# Patient Record
Sex: MALE | Race: WHITE | NOT HISPANIC OR LATINO | Employment: FULL TIME | URBAN - METROPOLITAN AREA
[De-identification: names, ages, dates, MRNs, and addresses within clinical notes are randomized per-mention and may not be internally consistent; named-entity substitution may affect disease eponyms.]

---

## 2019-10-13 ENCOUNTER — HOSPITAL ENCOUNTER (EMERGENCY)
Facility: HOSPITAL | Age: 39
Discharge: HOME | End: 2019-10-13
Admitting: EMERGENCY MEDICINE

## 2019-10-13 VITALS
WEIGHT: 141.54 LBS | DIASTOLIC BLOOD PRESSURE: 79 MMHG | SYSTOLIC BLOOD PRESSURE: 114 MMHG | TEMPERATURE: 97.8 F | OXYGEN SATURATION: 98 % | HEIGHT: 68 IN | BODY MASS INDEX: 21.45 KG/M2 | RESPIRATION RATE: 16 BRPM | HEART RATE: 74 BPM

## 2019-10-13 DIAGNOSIS — J06.9 URTI (ACUTE UPPER RESPIRATORY INFECTION): ICD-10-CM

## 2019-10-13 DIAGNOSIS — R63.0 DECREASED APPETITE: ICD-10-CM

## 2019-10-13 DIAGNOSIS — R68.89 FLU-LIKE SYMPTOMS: Primary | ICD-10-CM

## 2019-10-13 LAB
ANION GAP SERPL CALCULATED.3IONS-SCNC: 12.8 MMOL/L (ref 5–15)
BUN BLD-MCNC: 10 MG/DL (ref 8–20)
BUN/CREAT SERPL: 12.5 (ref 6.2–20.3)
CALCIUM SPEC-SCNC: 9.2 MG/DL (ref 8.9–10.3)
CHLORIDE SERPL-SCNC: 101 MMOL/L (ref 101–111)
CO2 SERPL-SCNC: 25 MMOL/L (ref 22–32)
CREAT BLD-MCNC: 0.8 MG/DL (ref 0.7–1.2)
DEPRECATED RDW RBC AUTO: 40.7 FL (ref 37–54)
EOSINOPHIL # BLD MANUAL: 0.06 10*3/MM3 (ref 0–0.4)
EOSINOPHIL NFR BLD MANUAL: 1 % (ref 0.3–6.2)
ERYTHROCYTE [DISTWIDTH] IN BLOOD BY AUTOMATED COUNT: 13.8 % (ref 12.3–15.4)
FLUAV AG NPH QL: NEGATIVE
FLUBV AG NPH QL IA: NEGATIVE
GFR SERPL CREATININE-BSD FRML MDRD: 108 ML/MIN/1.73
GLUCOSE BLD-MCNC: 114 MG/DL (ref 65–99)
HCT VFR BLD AUTO: 41.1 % (ref 37.5–51)
HGB BLD-MCNC: 14.5 G/DL (ref 13–17.7)
LYMPHOCYTES # BLD MANUAL: 1.23 10*3/MM3 (ref 0.7–3.1)
LYMPHOCYTES NFR BLD MANUAL: 22 % (ref 19.6–45.3)
LYMPHOCYTES NFR BLD MANUAL: 7 % (ref 5–12)
MCH RBC QN AUTO: 29.4 PG (ref 26.6–33)
MCHC RBC AUTO-ENTMCNC: 35.3 G/DL (ref 31.5–35.7)
MCV RBC AUTO: 83.1 FL (ref 79–97)
MONOCYTES # BLD AUTO: 0.39 10*3/MM3 (ref 0.1–0.9)
NEUTROPHILS # BLD AUTO: 3.86 10*3/MM3 (ref 1.7–7)
NEUTROPHILS NFR BLD MANUAL: 61 % (ref 42.7–76)
NEUTS BAND NFR BLD MANUAL: 8 % (ref 0–5)
PLAT MORPH BLD: NORMAL
PLATELET # BLD AUTO: 213 10*3/MM3 (ref 140–450)
PMV BLD AUTO: 7.4 FL (ref 6–12)
POTASSIUM BLD-SCNC: 3.8 MMOL/L (ref 3.6–5.1)
RBC # BLD AUTO: 4.95 10*6/MM3 (ref 4.14–5.8)
RBC MORPH BLD: NORMAL
SCAN SLIDE: NORMAL
SODIUM BLD-SCNC: 135 MMOL/L (ref 136–144)
VARIANT LYMPHS NFR BLD MANUAL: 1 % (ref 0–5)
WBC MORPH BLD: NORMAL
WBC NRBC COR # BLD: 5.6 10*3/MM3 (ref 3.4–10.8)

## 2019-10-13 PROCEDURE — 85025 COMPLETE CBC W/AUTO DIFF WBC: CPT | Performed by: PHYSICIAN ASSISTANT

## 2019-10-13 PROCEDURE — 99283 EMERGENCY DEPT VISIT LOW MDM: CPT

## 2019-10-13 PROCEDURE — 80048 BASIC METABOLIC PNL TOTAL CA: CPT | Performed by: PHYSICIAN ASSISTANT

## 2019-10-13 PROCEDURE — 87804 INFLUENZA ASSAY W/OPTIC: CPT | Performed by: PHYSICIAN ASSISTANT

## 2019-10-13 PROCEDURE — 85007 BL SMEAR W/DIFF WBC COUNT: CPT | Performed by: PHYSICIAN ASSISTANT

## 2019-10-13 RX ORDER — ONDANSETRON 4 MG/1
4 TABLET, ORALLY DISINTEGRATING ORAL EVERY 8 HOURS PRN
Qty: 20 TABLET | Refills: 0 | Status: SHIPPED | OUTPATIENT
Start: 2019-10-13 | End: 2021-01-08

## 2019-10-13 RX ORDER — SODIUM CHLORIDE 0.9 % (FLUSH) 0.9 %
10 SYRINGE (ML) INJECTION AS NEEDED
Status: DISCONTINUED | OUTPATIENT
Start: 2019-10-13 | End: 2019-10-13 | Stop reason: HOSPADM

## 2019-10-13 RX ADMIN — SODIUM CHLORIDE 1000 ML: 900 INJECTION, SOLUTION INTRAVENOUS at 12:43

## 2019-10-13 NOTE — DISCHARGE INSTRUCTIONS
Return to the ER for any worsening symptoms.  Drink plenty of fluids including Pedialyte.  Follow-up with the [facility] in 1 to 3 days if no better.

## 2019-10-13 NOTE — ED PROVIDER NOTES
Subjective   History:  She is a 39-year-old male presents to the ER with 2 to 3-day history of generalized malaise headache feeling weak.  He is concerned that he has a fluid he did not receive a flu vaccine this year.  Denies any significant cough.  Reports is constant.  His wife reports he has not been drinking as much at home.  He reports he had one episode of emesis this morning when he tried to drink some Gatorade.    Onset: 2-3 days  Location: generalized  Duration: constant  Character: fatigue, body aches, HA  Aggravating/Alleviating factors: None  Radiation None  Severity: moderate              Review of Systems   Constitutional: Positive for activity change, appetite change and fatigue.   Respiratory: Negative.    Cardiovascular: Negative.    Gastrointestinal: Negative.    Genitourinary: Negative.    Musculoskeletal: Negative.    Skin: Negative.    Neurological: Positive for headaches.   Psychiatric/Behavioral: Negative.        No past medical history on file.    No Known Allergies    No past surgical history on file.    No family history on file.    Social History     Socioeconomic History   • Marital status: Single     Spouse name: Not on file   • Number of children: Not on file   • Years of education: Not on file   • Highest education level: Not on file           Objective   Physical Exam   Constitutional: He is oriented to person, place, and time. He appears well-developed and well-nourished.   HENT:   Head: Normocephalic and atraumatic.   Eyes: Pupils are equal, round, and reactive to light.   Neck: Normal range of motion.   Cardiovascular: Normal rate and regular rhythm.   Pulmonary/Chest: Effort normal and breath sounds normal.   Musculoskeletal: Normal range of motion.   Neurological: He is alert and oriented to person, place, and time.   Skin: Skin is warm and dry.   Psychiatric: He has a normal mood and affect. His behavior is normal.       Procedures           ED Course        No radiology  results for the last day  Labs Reviewed   BASIC METABOLIC PANEL - Abnormal; Notable for the following components:       Result Value    Glucose 114 (*)     Sodium 135 (*)     All other components within normal limits   MANUAL DIFFERENTIAL - Abnormal; Notable for the following components:    Bands %  8.0 (*)     All other components within normal limits   INFLUENZA ANTIGEN, RAPID - Normal   CBC WITH AUTO DIFFERENTIAL - Normal   SCAN SLIDE   CBC AND DIFFERENTIAL    Narrative:     The following orders were created for panel order CBC & Differential.  Procedure                               Abnormality         Status                     ---------                               -----------         ------                     CBC Auto Differential[[id number]]        Normal              Final result                 Please view results for these tests on the individual orders.     Medications   sodium chloride 0.9 % flush 10 mL (not administered)   sodium chloride 0.9 % bolus 1,000 mL (1,000 mL Intravenous New Bag 10/13/19 1243)               MDM  Number of Diagnoses or Management Options  Decreased appetite:   Flu-like symptoms:   URTI (acute upper respiratory infection):   Diagnosis management comments: DISPOSITION:   Chart Review:  Comorbidity:  has no past medical history on file.  Differentials:this list is not all inclusive and does not constitute the entirety of considered causes --> Influenza, viral infection  Labs: CBC and BMP unremarkable     Imaging: Was interpreted by physician and reviewed by myself:  No radiology results for the last day    Disposition/Treatment:  Patient is a 39-year-old male presents to the ER with generalized fatigue headache malaise.  He was negative for flu his blood work is unremarkable he was rehydrated with a liter fluid he was discharged home with Zofran.  Return precautions all concerns were provided he was stable at time of discharge and family was in agreement with plan       Amount  and/or Complexity of Data Reviewed  Clinical lab tests: reviewed    Patient Progress  Patient progress: stable      Final diagnoses:   Flu-like symptoms   Decreased appetite   URTI (acute upper respiratory infection)              Frances Callaway PA-C  10/13/19 6137

## 2019-10-15 ENCOUNTER — HOSPITAL ENCOUNTER (EMERGENCY)
Facility: HOSPITAL | Age: 39
Discharge: HOME | End: 2019-10-15
Admitting: EMERGENCY MEDICINE

## 2019-10-15 ENCOUNTER — APPOINTMENT (OUTPATIENT)
Dept: CT IMAGING | Facility: HOSPITAL | Age: 39
End: 2019-10-15

## 2019-10-15 VITALS
HEART RATE: 68 BPM | HEIGHT: 68 IN | BODY MASS INDEX: 21.75 KG/M2 | SYSTOLIC BLOOD PRESSURE: 110 MMHG | DIASTOLIC BLOOD PRESSURE: 84 MMHG | TEMPERATURE: 98.1 F | WEIGHT: 143.52 LBS | OXYGEN SATURATION: 100 % | RESPIRATION RATE: 16 BRPM

## 2019-10-15 DIAGNOSIS — M79.10 MYALGIA: ICD-10-CM

## 2019-10-15 DIAGNOSIS — R74.8 ELEVATED LIVER ENZYMES: ICD-10-CM

## 2019-10-15 DIAGNOSIS — R50.9 FEVER IN ADULT: Primary | ICD-10-CM

## 2019-10-15 LAB
ALBUMIN SERPL-MCNC: 4.3 G/DL (ref 3.5–5.2)
ALBUMIN/GLOB SERPL: 1.7 G/DL
ALP SERPL-CCNC: 131 U/L (ref 39–117)
ALT SERPL W P-5'-P-CCNC: 203 U/L (ref 1–41)
AMPHET+METHAMPHET UR QL: NEGATIVE
ANION GAP SERPL CALCULATED.3IONS-SCNC: 14.7 MMOL/L (ref 5–15)
AST SERPL-CCNC: 145 U/L (ref 1–40)
B PERT DNA SPEC QL NAA+PROBE: NOT DETECTED
BARBITURATES UR QL SCN: NEGATIVE
BASOPHILS # BLD AUTO: 0.1 10*3/MM3 (ref 0–0.2)
BASOPHILS NFR BLD AUTO: 1 % (ref 0–1.5)
BENZODIAZ UR QL SCN: NEGATIVE
BILIRUB SERPL-MCNC: 0.8 MG/DL (ref 0.2–1.2)
BILIRUB UR QL STRIP: NEGATIVE
BUN BLD-MCNC: 8 MG/DL (ref 6–20)
BUN/CREAT SERPL: 10.4 (ref 7–25)
C PNEUM DNA NPH QL NAA+NON-PROBE: NOT DETECTED
CALCIUM SPEC-SCNC: 9.1 MG/DL (ref 8.6–10.5)
CANNABINOIDS SERPL QL: NEGATIVE
CHLORIDE SERPL-SCNC: 99 MMOL/L (ref 98–107)
CK SERPL-CCNC: 47 U/L (ref 20–200)
CLARITY UR: CLEAR
CO2 SERPL-SCNC: 25 MMOL/L (ref 22–29)
COCAINE UR QL: NEGATIVE
COLOR UR: ABNORMAL
CREAT BLD-MCNC: 0.77 MG/DL (ref 0.76–1.27)
DEPRECATED RDW RBC AUTO: 41.1 FL (ref 37–54)
EOSINOPHIL # BLD AUTO: 0.3 10*3/MM3 (ref 0–0.4)
EOSINOPHIL NFR BLD AUTO: 4.5 % (ref 0.3–6.2)
ERYTHROCYTE [DISTWIDTH] IN BLOOD BY AUTOMATED COUNT: 13.9 % (ref 12.3–15.4)
FLUAV H1 2009 PAND RNA NPH QL NAA+PROBE: NOT DETECTED
FLUAV H1 HA GENE NPH QL NAA+PROBE: NOT DETECTED
FLUAV H3 RNA NPH QL NAA+PROBE: NOT DETECTED
FLUAV SUBTYP SPEC NAA+PROBE: NOT DETECTED
FLUBV RNA ISLT QL NAA+PROBE: NOT DETECTED
GFR SERPL CREATININE-BSD FRML MDRD: 113 ML/MIN/1.73
GLOBULIN UR ELPH-MCNC: 2.5 GM/DL
GLUCOSE BLD-MCNC: 99 MG/DL (ref 65–99)
GLUCOSE UR STRIP-MCNC: NEGATIVE MG/DL
HADV DNA SPEC NAA+PROBE: NOT DETECTED
HAV IGM SERPL QL IA: NORMAL
HBV CORE IGM SERPL QL IA: NORMAL
HBV SURFACE AG SERPL QL IA: NORMAL
HCOV 229E RNA SPEC QL NAA+PROBE: NOT DETECTED
HCOV HKU1 RNA SPEC QL NAA+PROBE: NOT DETECTED
HCOV NL63 RNA SPEC QL NAA+PROBE: NOT DETECTED
HCOV OC43 RNA SPEC QL NAA+PROBE: NOT DETECTED
HCT VFR BLD AUTO: 39.5 % (ref 37.5–51)
HCV AB SER DONR QL: NORMAL
HGB BLD-MCNC: 13.8 G/DL (ref 13–17.7)
HGB UR QL STRIP.AUTO: NEGATIVE
HMPV RNA NPH QL NAA+NON-PROBE: NOT DETECTED
HOLD SPECIMEN: NORMAL
HPIV1 RNA SPEC QL NAA+PROBE: NOT DETECTED
HPIV2 RNA SPEC QL NAA+PROBE: NOT DETECTED
HPIV3 RNA NPH QL NAA+PROBE: NOT DETECTED
HPIV4 P GENE NPH QL NAA+PROBE: NOT DETECTED
KETONES UR QL STRIP: NEGATIVE
LEUKOCYTE ESTERASE UR QL STRIP.AUTO: NEGATIVE
LYMPHOCYTES # BLD AUTO: 1.7 10*3/MM3 (ref 0.7–3.1)
LYMPHOCYTES NFR BLD AUTO: 24.5 % (ref 19.6–45.3)
M PNEUMO IGG SER IA-ACNC: NOT DETECTED
MCH RBC QN AUTO: 29.3 PG (ref 26.6–33)
MCHC RBC AUTO-ENTMCNC: 34.9 G/DL (ref 31.5–35.7)
MCV RBC AUTO: 83.8 FL (ref 79–97)
METHADONE UR QL SCN: NEGATIVE
MONOCYTES # BLD AUTO: 0.6 10*3/MM3 (ref 0.1–0.9)
MONOCYTES NFR BLD AUTO: 8.8 % (ref 5–12)
NEUTROPHILS # BLD AUTO: 4.2 10*3/MM3 (ref 1.7–7)
NEUTROPHILS NFR BLD AUTO: 61.2 % (ref 42.7–76)
NITRITE UR QL STRIP: NEGATIVE
NRBC BLD AUTO-RTO: 0.1 /100 WBC (ref 0–0.2)
OPIATES UR QL: NEGATIVE
OXYCODONE UR QL SCN: NEGATIVE
PH UR STRIP.AUTO: 6 [PH] (ref 5–8)
PLATELET # BLD AUTO: 261 10*3/MM3 (ref 140–450)
PMV BLD AUTO: 7.6 FL (ref 6–12)
POTASSIUM BLD-SCNC: 3.7 MMOL/L (ref 3.5–5.2)
PROT SERPL-MCNC: 6.8 G/DL (ref 6–8.5)
PROT UR QL STRIP: NEGATIVE
RBC # BLD AUTO: 4.71 10*6/MM3 (ref 4.14–5.8)
RHINOVIRUS RNA SPEC NAA+PROBE: NOT DETECTED
RSV RNA NPH QL NAA+NON-PROBE: NOT DETECTED
S PYO AG THROAT QL: NEGATIVE
SODIUM BLD-SCNC: 135 MMOL/L (ref 136–145)
SP GR UR STRIP: 1.02 (ref 1–1.03)
UROBILINOGEN UR QL STRIP: ABNORMAL
WBC NRBC COR # BLD: 6.9 10*3/MM3 (ref 3.4–10.8)
WHOLE BLOOD HOLD SPECIMEN: NORMAL

## 2019-10-15 PROCEDURE — 36415 COLL VENOUS BLD VENIPUNCTURE: CPT

## 2019-10-15 PROCEDURE — 82550 ASSAY OF CK (CPK): CPT | Performed by: NURSE PRACTITIONER

## 2019-10-15 PROCEDURE — 86757 RICKETTSIA ANTIBODY: CPT | Performed by: NURSE PRACTITIONER

## 2019-10-15 PROCEDURE — 80307 DRUG TEST PRSMV CHEM ANLYZR: CPT | Performed by: NURSE PRACTITIONER

## 2019-10-15 PROCEDURE — 0 IOPAMIDOL PER 1 ML: Performed by: NURSE PRACTITIONER

## 2019-10-15 PROCEDURE — 86666 EHRLICHIA ANTIBODY: CPT | Performed by: NURSE PRACTITIONER

## 2019-10-15 PROCEDURE — 81003 URINALYSIS AUTO W/O SCOPE: CPT | Performed by: NURSE PRACTITIONER

## 2019-10-15 PROCEDURE — 99284 EMERGENCY DEPT VISIT MOD MDM: CPT

## 2019-10-15 PROCEDURE — 87040 BLOOD CULTURE FOR BACTERIA: CPT | Performed by: NURSE PRACTITIONER

## 2019-10-15 PROCEDURE — 86622 BRUCELLA ANTIBODY: CPT | Performed by: NURSE PRACTITIONER

## 2019-10-15 PROCEDURE — 86618 LYME DISEASE ANTIBODY: CPT | Performed by: NURSE PRACTITIONER

## 2019-10-15 PROCEDURE — 0099U HC BIOFIRE FILMARRAY RESP PANEL 1: CPT | Performed by: NURSE PRACTITIONER

## 2019-10-15 PROCEDURE — 80074 ACUTE HEPATITIS PANEL: CPT | Performed by: NURSE PRACTITIONER

## 2019-10-15 PROCEDURE — 80053 COMPREHEN METABOLIC PANEL: CPT | Performed by: NURSE PRACTITIONER

## 2019-10-15 PROCEDURE — 85025 COMPLETE CBC W/AUTO DIFF WBC: CPT | Performed by: NURSE PRACTITIONER

## 2019-10-15 PROCEDURE — 74177 CT ABD & PELVIS W/CONTRAST: CPT

## 2019-10-15 PROCEDURE — 87651 STREP A DNA AMP PROBE: CPT | Performed by: NURSE PRACTITIONER

## 2019-10-15 RX ORDER — SODIUM CHLORIDE 0.9 % (FLUSH) 0.9 %
10 SYRINGE (ML) INJECTION AS NEEDED
Status: DISCONTINUED | OUTPATIENT
Start: 2019-10-15 | End: 2019-10-15 | Stop reason: HOSPADM

## 2019-10-15 RX ORDER — OMEPRAZOLE 20 MG/1
20 CAPSULE, DELAYED RELEASE ORAL DAILY PRN
COMMUNITY

## 2019-10-15 RX ORDER — IBUPROFEN 200 MG
TABLET ORAL
Status: COMPLETED
Start: 2019-10-15 | End: 2019-10-15

## 2019-10-15 RX ORDER — DOXYCYCLINE HYCLATE 100 MG
100 TABLET ORAL 2 TIMES DAILY
Qty: 20 TABLET | Refills: 0 | Status: SHIPPED | OUTPATIENT
Start: 2019-10-15 | End: 2019-10-25

## 2019-10-15 RX ORDER — IBUPROFEN 400 MG/1
800 TABLET ORAL ONCE
Status: COMPLETED | OUTPATIENT
Start: 2019-10-15 | End: 2019-10-15

## 2019-10-15 RX ADMIN — IBUPROFEN 800 MG: 400 TABLET ORAL at 12:16

## 2019-10-15 RX ADMIN — IOPAMIDOL 100 ML: 755 INJECTION, SOLUTION INTRAVENOUS at 11:15

## 2019-10-15 RX ADMIN — SODIUM CHLORIDE 1000 ML: 900 INJECTION, SOLUTION INTRAVENOUS at 08:54

## 2019-10-15 RX ADMIN — IBUPROFEN 800 MG: 200 TABLET, FILM COATED ORAL at 12:16

## 2019-10-15 NOTE — ED NOTES
Awaiting plan of care, family at bedside denies the need for anything at this time     Kalyn Gimenez RN  10/15/19 3154

## 2019-10-15 NOTE — ED NOTES
Pt is awaiting CT results at this time  Denies the need for anything     Kalyn Gimenez RN  10/15/19 1889

## 2019-10-15 NOTE — ED NOTES
Pt states that symptoms started thurs, that he came to the ER Sunday and still has cont to feel worse. Pt c/o cold/ hot flashes with some nausea and being achy. Pt also states his nose has been bleeding.      Lokesh Eckert, LPN  10/15/19 0846

## 2019-10-15 NOTE — ED NOTES
Pt is resting in bed. SO at bedside awaiting results. Denies the need for anything at this time     Kalyn Gimenez RN  10/15/19 0979

## 2019-10-15 NOTE — ED PROVIDER NOTES
Subjective   Patient is a 39-year-old white male with no significant medical history who presents today with complaints of generalized body aches, chills, and just feeling bad overall.  States his symptoms started approximately 5 days ago.  States he was seen here in the ED 2 days ago for the same symptoms had benign work-up was told that he probably had a viral illness.  Reports his symptoms are no better.  He denies any cough or shortness of breath.  Does report some mild nasal congestion.  Denies sore throat.  Denies any nausea vomiting or diarrhea.  Reports no bowel movement x1 week but states he has not had much oral intake due to just not feeling well.  Reports decrease in urine output with some dark-colored urine.  He denies any dysuria frequency urgency.  Denies any chest pain or abdominal pain.  Denies any rash.  Denies any known tick or insect bites.  Denies any ill contacts or recent travel.            Review of Systems   Constitutional: Positive for chills and fatigue. Negative for fever.   HENT: Positive for congestion. Negative for sore throat.    Respiratory: Negative for cough and shortness of breath.    Cardiovascular: Negative for chest pain.   Gastrointestinal: Negative for abdominal pain, diarrhea, nausea and vomiting.   Genitourinary: Positive for decreased urine volume. Negative for difficulty urinating, dysuria, frequency and urgency.   Musculoskeletal: Positive for myalgias.   Skin: Negative for rash.   Neurological: Positive for weakness. Negative for headaches.       Past Medical History:   Diagnosis Date   • GERD (gastroesophageal reflux disease)        No Known Allergies    Past Surgical History:   Procedure Laterality Date   • ABDOMINAL SURGERY         History reviewed. No pertinent family history.    Social History     Socioeconomic History   • Marital status: Single     Spouse name: Not on file   • Number of children: Not on file   • Years of education: Not on file   • Highest  education level: Not on file   Tobacco Use   • Smoking status: Former Smoker   • Smokeless tobacco: Never Used   • Tobacco comment: 7 months ago   Substance and Sexual Activity   • Alcohol use: No     Frequency: Never   • Drug use: No   • Sexual activity: Defer           Objective   Physical Exam   Constitutional: He appears well-developed.   Vital signs and triage nurse note reviewed.  Constitutional: Awake, alert; well-developed and well-nourished. No acute distress is noted.  HEENT: Normocephalic, atraumatic; pupils are PERRL with intact EOM; oropharynx is pink and moist without exudate or erythema.  No drooling or pooling of oral secretions.  Neck: Supple, full range of motion without pain; no cervical lymphadenopathy. Normal phonation.  Cardiovascular: Regular rate and rhythm, normal S1-S2.  No murmur noted.  Pulmonary: Respiratory effort regular nonlabored, breath sounds clear to auscultation all fields.  Abdomen: Soft, nontender, nondistended with normoactive bowel sounds; no rebound or guarding.  Musculoskeletal: Independent range of motion of all extremities with no palpable tenderness or edema.  Neuro: Alert oriented x3, speech is clear and appropriate, GCS 15.    Skin: Flesh tone, warm, dry, intact; no erythematous or petechial rash or lesion.        Procedures           ED Course      Labs Reviewed   COMPREHENSIVE METABOLIC PANEL - Abnormal; Notable for the following components:       Result Value    Sodium 135 (*)     ALT (SGPT) 203 (*)     AST (SGOT) 145 (*)     Alkaline Phosphatase 131 (*)     All other components within normal limits    Narrative:     GFR Normal >60  Chronic Kidney Disease <60  Kidney Failure <15   URINALYSIS W/ CULTURE IF INDICATED - Abnormal; Notable for the following components:    Color, UA Dark Yellow (*)     Urobilinogen, UA 2.0 E.U./dL (*)     All other components within normal limits    Narrative:     Urine microscopic not indicated.   RAPID STREP A SCREEN - Normal    RESPIRATORY PANEL, PCR - Normal   URINE DRUG SCREEN - Normal    Narrative:     Negative Thresholds For Drugs Screened:     Amphetamines               500 ng/ml   Barbiturates               200 ng/ml   Benzodiazepines            100 ng/ml   Cocaine                    300 ng/ml   Methadone                  300 ng/ml   Opiates                    300 ng/ml   Oxycodone                  100 ng/ml   THC                        50 ng/ml    The Normal Value for all drugs tested is negative. This report includes final unconfirmed screening results to be used for medical treatment purposes only. Unconfirmed results must not be used for non-medical purposes such as employment or legal testing. Clinical consideration should be applied to any drug of abuse test, particulary when unconfirmed results are used.  All urine drugs of abuse requests without chain of custody are for medical screening purposes only.  False positives are possible.     CK - Normal   CBC WITH AUTO DIFFERENTIAL - Normal   HEPATITIS PANEL, ACUTE - Normal   BLOOD CULTURE   BLOOD CULTURE   TICK PANEL    Narrative:     The following orders were created for panel order Tick Panel - Blood, Arm, Right.  Procedure                               Abnormality         Status                     ---------                               -----------         ------                     Febrile Antibody Profile[[id number]]                                                    E. Chaffeenis-HME (Monoc...[[id number]]                                                 Human Granulocytic Ehrli...[[id number]]                                                 Lyme Antibody IgG[[id number]]                                                             Please view results for these tests on the individual orders.   FEBRILE ANTIBODY PROFILE   E. CHAFFEENIS-HME (MONOCYTIC)   HUMAN GRANULOCYTIC EVAN-HGE   LYME ANTIBODY IGG   CBC AND DIFFERENTIAL    Narrative:     The following orders were created for  panel order CBC & Differential.  Procedure                               Abnormality         Status                     ---------                               -----------         ------                     CBC Auto Differential[[id number]]        Normal              Final result                 Please view results for these tests on the individual orders.   EXTRA TUBES    Narrative:     The following orders were created for panel order Extra Tubes.  Procedure                               Abnormality         Status                     ---------                               -----------         ------                     Light Blue Top[[id number]]                                   Final result               Gold Top - SST[[id number]]                                   Final result                 Please view results for these tests on the individual orders.   LIGHT BLUE TOP   GOLD TOP - SST     Ct Abdomen Pelvis With Contrast    Result Date: 10/15/2019  No acute process identified within abdomen/pelvis.  Electronically Signed By-DR. Denzel Her MD On:10/15/2019 11:42 AM This report was finalized on [id number] by DR. Denzel Her MD.    Medications   sodium chloride 0.9 % flush 10 mL (10 mL Intravenous Not Given 10/15/19 1241)   sodium chloride 0.9 % bolus 1,000 mL (0 mL Intravenous Stopped 10/15/19 0924)   iopamidol (ISOVUE-370) 76 % injection 100 mL (100 mL Intravenous Given 10/15/19 1115)   ibuprofen (ADVIL,MOTRIN) tablet 800 mg (800 mg Oral Given 10/15/19 1216)                 MDM  Number of Diagnoses or Management Options  Elevated liver enzymes:   Fever in adult:   Myalgia:      Amount and/or Complexity of Data Reviewed  Clinical lab tests: ordered and reviewed  Tests in the radiology section of CPT®: reviewed and ordered    Risk of Complications, Morbidity, and/or Mortality  General comments: Comorbidities: None  Differentials: Viral illness, dehydration, elect light abnormality,;this list is not all  inclusive and does not constitute the entirety of considered causes    Patient had IV established.  He had labs obtained.  He was given a fluid bolus.  Patient is given ibuprofen for his fever.  Upon review of labs, patient had CT of the abdomen pelvis obtained.    On reexamination, patient is resting comfortably in no acute distress.  He denies any new complaints at this time.  His vital signs are stable.  He remains well-appearing and nontoxic in appearance.  Tick panel is pending on discharge.    Diagnosis and treatment plan discussed with patient.  Patient agreeable to plan.   I discussed findings with patient who voices understanding of discharge instructions, signs and symptoms requiring return to ED; discharged improved and in stable condition with follow up for re-evaluation.  Prescription for doxycycline.      Patient Progress  Patient progress: stable      Final diagnoses:   Fever in adult   Myalgia   Elevated liver enzymes              Melany Black, APRN  10/15/19 1243

## 2019-10-15 NOTE — ED NOTES
Pt reports that he has a headache and is starting to have chills again, temp was taken 100.4 rectally. Motrin was given. Vitals are stable     Kalyn Gimenez RN  10/15/19 9108

## 2019-10-15 NOTE — DISCHARGE INSTRUCTIONS
Take antibiotics as prescribed.  Drink plenty fluids.  May continue ibuprofen as needed every 8 hours for your fever.  Avoid Tylenol or acetaminophen.  Follow-up with primary care physician or [facility].  Return for new or worsening symptoms.

## 2019-10-16 LAB — B BURGDOR IGG SER QL: NEGATIVE

## 2019-10-17 LAB
A PHAGOCYTOPH IGM TITR SER IF: NEGATIVE {TITER}
CONV HGE IGG TITER: NEGATIVE
E CHAFFEENSIS IGG TITR SER IF: NEGATIVE {TITER}
E. CHAFFEENSIS (HME) IGM TITER: NEGATIVE

## 2019-10-20 LAB — BACTERIA SPEC AEROBE CULT: NORMAL

## 2019-10-22 LAB
BRUCELLA IGG SER QL IA: NEGATIVE
BRUCELLA IGM SER QL IA: NEGATIVE
RICK SF IGG TITR SER IF: NORMAL {TITER}
RICK SF IGM TITR SER IF: NORMAL {TITER}
TYPHUS FEVER GROUP IGG: NORMAL
TYPHUS FEVER GROUP IGM: NORMAL

## 2020-04-10 ENCOUNTER — OFFICE (OUTPATIENT)
Dept: URBAN - METROPOLITAN AREA CLINIC 64 | Facility: CLINIC | Age: 40
End: 2020-04-10
Payer: COMMERCIAL

## 2020-04-10 VITALS — HEIGHT: 68 IN

## 2020-04-10 DIAGNOSIS — K62.5 HEMORRHAGE OF ANUS AND RECTUM: ICD-10-CM

## 2020-04-10 PROCEDURE — 99202 OFFICE O/P NEW SF 15 MIN: CPT | Mod: 95 | Performed by: INTERNAL MEDICINE

## 2020-04-10 RX ORDER — METRONIDAZOLE 500 MG/1
1000 TABLET, FILM COATED ORAL
Qty: 28 | Refills: 0 | Status: COMPLETED
Start: 2020-04-10 | End: 2020-07-29

## 2020-04-10 RX ORDER — HYDROCORTISONE 25 MG/G
OINTMENT TOPICAL
Qty: 30 | Refills: 3 | Status: ACTIVE
Start: 2020-04-10

## 2020-07-29 ENCOUNTER — OFFICE (OUTPATIENT)
Dept: URBAN - METROPOLITAN AREA CLINIC 64 | Facility: CLINIC | Age: 40
End: 2020-07-29

## 2020-07-29 VITALS
WEIGHT: 146 LBS | HEIGHT: 68 IN | HEART RATE: 75 BPM | DIASTOLIC BLOOD PRESSURE: 74 MMHG | SYSTOLIC BLOOD PRESSURE: 106 MMHG

## 2020-07-29 DIAGNOSIS — K62.5 HEMORRHAGE OF ANUS AND RECTUM: ICD-10-CM

## 2020-07-29 DIAGNOSIS — R14.3 FLATULENCE: ICD-10-CM

## 2020-07-29 DIAGNOSIS — R19.5 OTHER FECAL ABNORMALITIES: ICD-10-CM

## 2020-07-29 PROCEDURE — 99213 OFFICE O/P EST LOW 20 MIN: CPT | Performed by: INTERNAL MEDICINE

## 2020-07-29 RX ORDER — HYDROCORTISONE 25 MG/G
OINTMENT TOPICAL
Qty: 30 | Refills: 3 | Status: ACTIVE
Start: 2020-04-10

## 2020-10-05 ENCOUNTER — APPOINTMENT (OUTPATIENT)
Dept: GENERAL RADIOLOGY | Facility: HOSPITAL | Age: 40
End: 2020-10-05

## 2020-10-05 ENCOUNTER — HOSPITAL ENCOUNTER (EMERGENCY)
Facility: HOSPITAL | Age: 40
Discharge: HOME | End: 2020-10-05
Admitting: EMERGENCY MEDICINE

## 2020-10-05 VITALS
SYSTOLIC BLOOD PRESSURE: 113 MMHG | OXYGEN SATURATION: 98 % | HEART RATE: 87 BPM | DIASTOLIC BLOOD PRESSURE: 76 MMHG | RESPIRATION RATE: 18 BRPM | WEIGHT: 144.4 LBS | TEMPERATURE: 99 F | HEIGHT: 68 IN | BODY MASS INDEX: 21.89 KG/M2

## 2020-10-05 DIAGNOSIS — R05.9 COUGH: ICD-10-CM

## 2020-10-05 DIAGNOSIS — U07.1 CLINICAL DIAGNOSIS OF COVID-19: Primary | ICD-10-CM

## 2020-10-05 LAB — SARS-COV-2 RNA PNL SPEC NAA+PROBE: DETECTED

## 2020-10-05 PROCEDURE — 71045 X-RAY EXAM CHEST 1 VIEW: CPT

## 2020-10-05 PROCEDURE — 99283 EMERGENCY DEPT VISIT LOW MDM: CPT

## 2020-10-05 PROCEDURE — 87635 SARS-COV-2 COVID-19 AMP PRB: CPT | Performed by: NURSE PRACTITIONER

## 2020-10-05 RX ORDER — BROMPHENIRAMINE MALEATE, PSEUDOEPHEDRINE HYDROCHLORIDE, AND DEXTROMETHORPHAN HYDROBROMIDE 2; 30; 10 MG/5ML; MG/5ML; MG/5ML
5 SYRUP ORAL 4 TIMES DAILY PRN
Qty: 118 ML | Refills: 0 | Status: SHIPPED | OUTPATIENT
Start: 2020-10-05 | End: 2021-01-08

## 2020-10-05 NOTE — DISCHARGE INSTRUCTIONS
Quarantine for the next 11 days.  Follow-up with your family doctor or family [facility] for new or worsening symptoms, return for any new or worsening symptoms.  Medications as directed.  May take Tylenol.  Plenty fluids.

## 2020-10-05 NOTE — ED PROVIDER NOTES
"Subjective         Chief complaint: \"I want a covid test\"      Context: Patient is a 40-year-old male who comes in complaining of cough and generalized myalgias for the last 3 days.  He states his stepdaughter tested positive for COVID 3 days ago.  He states his temperature T-max has been 100.  He denies any loss of smell or taste.  He denies any abdominal pain nausea vomiting diarrhea chest pain unilateral focal deficits weakness confusion ataxia lethargy or swelling in his legs or feet.  Denies any hemoptysis, he states his cough is mostly dry.    Duration: 3 days    Timing: waxes and wanes    Severity: mild    Associated symptoms: has been taking tylenol with some relief          PCP:  None          Review of Systems   Constitutional: Positive for chills.   HENT: Negative.    Eyes: Negative for visual disturbance.   Respiratory: Positive for cough.    Cardiovascular: Negative.    Gastrointestinal: Negative.    Genitourinary: Negative.    Musculoskeletal: Positive for myalgias.   Skin: Negative.    Allergic/Immunologic: Negative for immunocompromised state.   Neurological: Negative.    Hematological: Does not bruise/bleed easily.   Psychiatric/Behavioral: Negative for confusion.       Past Medical History:   Diagnosis Date   • GERD (gastroesophageal reflux disease)        No Known Allergies    Past Surgical History:   Procedure Laterality Date   • ABDOMINAL SURGERY         No family history on file.    Social History     Socioeconomic History   • Marital status: Single     Spouse name: Not on file   • Number of children: Not on file   • Years of education: Not on file   • Highest education level: Not on file   Tobacco Use   • Smoking status: Former Smoker   • Smokeless tobacco: Never Used   • Tobacco comment: 7 months ago   Substance and Sexual Activity   • Alcohol use: No     Frequency: Never   • Drug use: No   • Sexual activity: Defer           Objective   Physical Exam     Vital signs and triage nurse note " reviewed.   Constitutional: Awake, alert; well-developed and well-nourished. No acute distress is noted.   HEENT: Normocephalic, atraumatic; pupils are PERRL with intact EOM; oropharynx is pink and moist without exudate or erythema.   Neck: Supple, full range of motion without pain; no cervical lymphadenopathy.   Cardiovascular: Regular rate and rhythm, normal S1-S2.   Pulmonary: Respiratory effort regular nonlabored, breath sounds clear to auscultation all fields.   Abdomen: Soft, nontender nondistended with normoactive bowel sounds; no rebound or guarding.   Musculoskeletal: Independent range of motion of all extremities with no palpable tenderness or edema.   Neuro: Alert oriented x3, speech is clear and appropriate, GCS 15   Skin:  Fleshtone warm, dry, intact; no erythematous or petechial rash or lesion       Procedures           ED Course            Labs Reviewed   COVID-19,HEAD BIO IN-HOUSE,NASAL SWAB NO TRANSPORT MEDIA 2 HR TAT - Abnormal; Notable for the following components:       Result Value    COVID19 Detected (*)     All other components within normal limits    Narrative:     Fact sheet for providers: https://www.fda.gov/media/340201/download     Fact sheet for patients: https://www.fda.gov/media/437341/download     Medications - No data to display  Xr Chest Ap    Result Date: 10/5/2020  No active disease.  Electronically Signed By-Ang Perea On:10/5/2020 12:01 PM This report was finalized on [id number] by  Ang Perea, .                                      MDM  Number of Diagnoses or Management Options  Clinical diagnosis of COVID-19:   Cough:   Diagnosis management comments:   Comorbidities:  has a past medical history of GERD (gastroesophageal reflux disease).  Differentials: Virus pneumonia   not all inclusive of differentials considered  Radiology interpretation:  X-rays reviewed by me and interpreted by radiologist,   Xr Chest Ap    Result Date: 10/5/2020  No active disease.  Electronically  Signed By-Ang Perea On:10/5/2020 12:01 PM This report was finalized on [id number] by  Ang Perea, .    Lab interpretation:  Labs viewed by me significant for, COVID positive    Appropriate PPE worn during exam.    i discussed findings with patient who voices understanding of discharge instructions, signs and symptoms requiring return to ED; discharged improved and in stable condition with follow up for re-evaluation.  Discharged home with [place]      Final diagnoses:   Clinical diagnosis of COVID-19   Cough            Danielle Cole, APRN  10/05/20 1256

## 2020-10-06 ENCOUNTER — EPISODE CHANGES (OUTPATIENT)
Dept: CASE MANAGEMENT | Facility: OTHER | Age: 40
End: 2020-10-06

## 2020-10-08 ENCOUNTER — EPISODE CHANGES (OUTPATIENT)
Dept: CASE MANAGEMENT | Facility: OTHER | Age: 40
End: 2020-10-08

## 2020-10-09 ENCOUNTER — EPISODE CHANGES (OUTPATIENT)
Dept: CASE MANAGEMENT | Facility: OTHER | Age: 40
End: 2020-10-09

## 2020-10-15 ENCOUNTER — EPISODE CHANGES (OUTPATIENT)
Dept: CASE MANAGEMENT | Facility: OTHER | Age: 40
End: 2020-10-15

## 2021-01-08 ENCOUNTER — HOSPITAL ENCOUNTER (OUTPATIENT)
Dept: GENERAL RADIOLOGY | Facility: HOSPITAL | Age: 41
Discharge: HOME | End: 2021-01-08
Admitting: NURSE PRACTITIONER

## 2021-01-08 ENCOUNTER — OFFICE VISIT (OUTPATIENT)
Dept: FAMILY MEDICINE CLINIC | Facility: CLINIC | Age: 41
End: 2021-01-08

## 2021-01-08 ENCOUNTER — TELEPHONE (OUTPATIENT)
Dept: FAMILY MEDICINE CLINIC | Facility: CLINIC | Age: 41
End: 2021-01-08

## 2021-01-08 VITALS
TEMPERATURE: 97.8 F | HEIGHT: 67 IN | RESPIRATION RATE: 18 BRPM | WEIGHT: 150.4 LBS | BODY MASS INDEX: 23.61 KG/M2 | SYSTOLIC BLOOD PRESSURE: 120 MMHG | DIASTOLIC BLOOD PRESSURE: 77 MMHG | OXYGEN SATURATION: 100 % | HEART RATE: 69 BPM

## 2021-01-08 DIAGNOSIS — L98.9 SKIN LESIONS: ICD-10-CM

## 2021-01-08 DIAGNOSIS — M25.512 ACUTE PAIN OF LEFT SHOULDER: Primary | ICD-10-CM

## 2021-01-08 DIAGNOSIS — E53.8 VITAMIN B12 DEFICIENCY: ICD-10-CM

## 2021-01-08 DIAGNOSIS — K21.9 GASTROESOPHAGEAL REFLUX DISEASE WITHOUT ESOPHAGITIS: ICD-10-CM

## 2021-01-08 DIAGNOSIS — E78.2 MIXED HYPERLIPIDEMIA: ICD-10-CM

## 2021-01-08 DIAGNOSIS — M25.512 ACUTE PAIN OF LEFT SHOULDER: ICD-10-CM

## 2021-01-08 DIAGNOSIS — R53.83 OTHER FATIGUE: ICD-10-CM

## 2021-01-08 DIAGNOSIS — I10 ESSENTIAL HYPERTENSION: ICD-10-CM

## 2021-01-08 DIAGNOSIS — E55.9 VITAMIN D DEFICIENCY: ICD-10-CM

## 2021-01-08 PROCEDURE — 84443 ASSAY THYROID STIM HORMONE: CPT | Performed by: NURSE PRACTITIONER

## 2021-01-08 PROCEDURE — 82607 VITAMIN B-12: CPT | Performed by: NURSE PRACTITIONER

## 2021-01-08 PROCEDURE — 85025 COMPLETE CBC W/AUTO DIFF WBC: CPT | Performed by: NURSE PRACTITIONER

## 2021-01-08 PROCEDURE — 83036 HEMOGLOBIN GLYCOSYLATED A1C: CPT | Performed by: NURSE PRACTITIONER

## 2021-01-08 PROCEDURE — 73030 X-RAY EXAM OF SHOULDER: CPT

## 2021-01-08 PROCEDURE — 80061 LIPID PANEL: CPT | Performed by: NURSE PRACTITIONER

## 2021-01-08 PROCEDURE — 36415 COLL VENOUS BLD VENIPUNCTURE: CPT | Performed by: NURSE PRACTITIONER

## 2021-01-08 PROCEDURE — 80053 COMPREHEN METABOLIC PANEL: CPT | Performed by: NURSE PRACTITIONER

## 2021-01-08 PROCEDURE — 82306 VITAMIN D 25 HYDROXY: CPT | Performed by: NURSE PRACTITIONER

## 2021-01-08 PROCEDURE — 99204 OFFICE O/P NEW MOD 45 MIN: CPT | Performed by: NURSE PRACTITIONER

## 2021-01-08 PROCEDURE — 84439 ASSAY OF FREE THYROXINE: CPT | Performed by: NURSE PRACTITIONER

## 2021-01-08 RX ORDER — ACETAMINOPHEN 500 MG
500 TABLET ORAL 2 TIMES DAILY PRN
COMMUNITY

## 2021-01-08 NOTE — PROGRESS NOTES
Chief Complaint   Patient presents with   • Shoulder Pain     (L)   • SKIN LESIONS        Subjective   Gerardo Hyatt is a 40 y.o.  male who presents today for   Reports an injury while working on are to left shoulder has gotten some better but would like a work up on this  Skin lesion over face which has been there for a while has been told that her has psoriasis in the past needs referral for biopsy pt is agreeable for this     Gerardo Hyatt  has a past medical history of GERD (gastroesophageal reflux disease).    No Known Allergies    Current Outpatient Medications:   •  acetaminophen (TYLENOL) 500 MG tablet, Take 500 mg by mouth 2 (Two) Times a Day As Needed for Mild Pain , Moderate Pain , Headache or Fever., Disp: , Rfl:   •  omeprazole (priLOSEC) 20 MG capsule, Take 20 mg by mouth Daily As Needed., Disp: , Rfl:   •  Crisaborole 2 % ointment, Apply 1 application topically 2 (two) times a day., Disp: 60 tube, Rfl: 0  •  triamcinolone (KENALOG) 0.025 % ointment, Apply  topically to the appropriate area as directed 2 (Two) Times a Day for 10 days., Disp: 15 g, Rfl: 0  Past Medical History:   Diagnosis Date   • GERD (gastroesophageal reflux disease)      Past Surgical History:   Procedure Laterality Date   • ABDOMINAL SURGERY     • COLONOSCOPY       Social History     Socioeconomic History   • Marital status: Single     Spouse name: Not on file   • Number of children: Not on file   • Years of education: Not on file   • Highest education level: Not on file   Tobacco Use   • Smoking status: Former Smoker     Packs/day: 2.00     Years: 21.00     Pack years: 42.00     Types: Cigarettes     Start date:      Quit date: 2019     Years since quittin.0   • Smokeless tobacco: Never Used   Substance and Sexual Activity   • Alcohol use: No     Frequency: Never   • Drug use: No   • Sexual activity: Yes     Partners: Female     Family History   Problem Relation Age of Onset   • No Known Problems Mother   "    PHQ-2 Depression Screening  Little interest or pleasure in doing things? 0   Feeling down, depressed, or hopeless? 0   PHQ-2 Total Score 0     PHQ-9 Depression Screening  Little interest or pleasure in doing things? 0   Feeling down, depressed, or hopeless? 0   Trouble falling or staying asleep, or sleeping too much?     Feeling tired or having little energy?     Poor appetite or overeating?     Feeling bad about yourself - or that you are a failure or have let yourself or your family down?     Trouble concentrating on things, such as reading the newspaper or watching television?     Moving or speaking so slowly that other people could have noticed? Or the opposite - being so fidgety or restless that you have been moving around a lot more than usual?     Thoughts that you would be better off dead, or of hurting yourself in some way?     PHQ-9 Total Score 0   If you checked off any problems, how difficult have these problems made it for you to do your work, take care of things at home, or get along with other people?         Family history, surgical history, past medical history, Allergies and med's reviewed with patient today and updated in [employer] EMR.     ROS:  Review of Systems   Skin: Positive for color change and rash.   All other systems reviewed and are negative.      OBJECTIVE:  Vitals:    01/08/21 0845   BP: 120/77   BP Location: Left arm   Patient Position: Sitting   Cuff Size: Adult   Pulse: 69   Resp: 18   Temp: 97.8 °F (36.6 °C)   TempSrc: Infrared   SpO2: 100%   Weight: 68.2 kg (150 lb 6.4 oz)   Height: 170.8 cm (67.25\")     Body mass index is 23.38 kg/m².  Physical Exam  Vitals signs and nursing note reviewed.   Constitutional:       Appearance: Normal appearance.   HENT:      Head: Normocephalic.      Nose: Nose normal.      Mouth/Throat:      Mouth: Mucous membranes are dry.   Eyes:      Extraocular Movements: Extraocular movements intact.      Pupils: Pupils are equal, round, and reactive to " light.   Neck:      Musculoskeletal: Normal range of motion and neck supple.   Cardiovascular:      Rate and Rhythm: Normal rate.      Pulses: Normal pulses.   Pulmonary:      Effort: Pulmonary effort is normal.      Breath sounds: Normal breath sounds.   Abdominal:      General: Abdomen is flat. Bowel sounds are normal.      Palpations: Abdomen is soft.   Musculoskeletal: Normal range of motion.      Right shoulder: He exhibits tenderness. He exhibits normal range of motion, no bony tenderness, no swelling, no effusion and no crepitus.   Skin:     General: Skin is warm and dry.   Neurological:      General: No focal deficit present.      Mental Status: He is alert.   Psychiatric:         Mood and Affect: Mood normal.         Behavior: Behavior normal.         Thought Content: Thought content normal.         Judgment: Judgment normal.         ASSESSMENT/ PLAN:    Diagnoses and all orders for this visit:    1. Acute pain of left shoulder (Primary)  -     XR Shoulder 2+ View Left; Future    2. Mixed hyperlipidemia  -     Lipid Panel; Future  -     Lipid Panel    3. Essential hypertension  -     CBC & Differential; Future  -     CBC & Differential  -     CBC Auto Differential    4. Vitamin B12 deficiency  -     Vitamin B12; Future  -     Vitamin B12    5. Vitamin D deficiency  -     Vitamin D 25 Hydroxy; Future  -     Vitamin D 25 Hydroxy    6. Other fatigue  -     Comprehensive Metabolic Panel; Future  -     TSH; Future  -     T4, Free; Future  -     Hemoglobin A1c; Future  -     Comprehensive Metabolic Panel  -     TSH  -     T4, Free  -     Hemoglobin A1c    7. Skin lesions    8. Gastroesophageal reflux disease without esophagitis    Other orders  -     Crisaborole 2 % ointment; Apply 1 application topically 2 (two) times a day.  Dispense: 60 tube; Refill: 0        Orders Placed Today:     New Medications Ordered This Visit   Medications   • Crisaborole 2 % ointment     Sig: Apply 1 application topically 2 (two)  times a day.     Dispense:  60 tube     Refill:  0        Management Plan:   Shoulder xray  Needs labs today  Trial of Eucrisa referral to dermatology  GERD is stable       An After Visit Summary was printed and given to the patient at discharge.    Follow-up: Return in about 2 weeks (around 1/22/2021).    Kisha Linares, APRN 1/12/2021 10:17 EST  This note was electronically signed.

## 2021-01-09 LAB
25(OH)D3 SERPL-MCNC: 21.5 NG/ML (ref 30–100)
ALBUMIN SERPL-MCNC: 4.5 G/DL (ref 3.5–5.2)
ALBUMIN/GLOB SERPL: 1.7 G/DL
ALP SERPL-CCNC: 64 U/L (ref 39–117)
ALT SERPL W P-5'-P-CCNC: 12 U/L (ref 1–41)
ANION GAP SERPL CALCULATED.3IONS-SCNC: 12.1 MMOL/L (ref 5–15)
AST SERPL-CCNC: 18 U/L (ref 1–40)
BASOPHILS # BLD AUTO: 0.07 10*3/MM3 (ref 0–0.2)
BASOPHILS NFR BLD AUTO: 0.8 % (ref 0–1.5)
BILIRUB SERPL-MCNC: 0.6 MG/DL (ref 0–1.2)
BUN SERPL-MCNC: 18 MG/DL (ref 6–20)
BUN/CREAT SERPL: 23.1 (ref 7–25)
CALCIUM SPEC-SCNC: 9.4 MG/DL (ref 8.6–10.5)
CHLORIDE SERPL-SCNC: 101 MMOL/L (ref 98–107)
CHOLEST SERPL-MCNC: 189 MG/DL (ref 0–200)
CO2 SERPL-SCNC: 24.9 MMOL/L (ref 22–29)
CREAT SERPL-MCNC: 0.78 MG/DL (ref 0.76–1.27)
DEPRECATED RDW RBC AUTO: 39.5 FL (ref 37–54)
EOSINOPHIL # BLD AUTO: 0.29 10*3/MM3 (ref 0–0.4)
EOSINOPHIL NFR BLD AUTO: 3.4 % (ref 0.3–6.2)
ERYTHROCYTE [DISTWIDTH] IN BLOOD BY AUTOMATED COUNT: 13 % (ref 12.3–15.4)
GFR SERPL CREATININE-BSD FRML MDRD: 111 ML/MIN/1.73
GLOBULIN UR ELPH-MCNC: 2.7 GM/DL
GLUCOSE SERPL-MCNC: 79 MG/DL (ref 65–99)
HCT VFR BLD AUTO: 41.8 % (ref 37.5–51)
HDLC SERPL-MCNC: 40 MG/DL (ref 40–60)
HGB BLD-MCNC: 14.5 G/DL (ref 13–17.7)
IMM GRANULOCYTES # BLD AUTO: 0.03 10*3/MM3 (ref 0–0.05)
IMM GRANULOCYTES NFR BLD AUTO: 0.3 % (ref 0–0.5)
LDLC SERPL CALC-MCNC: 137 MG/DL (ref 0–100)
LDLC/HDLC SERPL: 3.41 {RATIO}
LYMPHOCYTES # BLD AUTO: 2.6 10*3/MM3 (ref 0.7–3.1)
LYMPHOCYTES NFR BLD AUTO: 30.1 % (ref 19.6–45.3)
MCH RBC QN AUTO: 29.4 PG (ref 26.6–33)
MCHC RBC AUTO-ENTMCNC: 34.7 G/DL (ref 31.5–35.7)
MCV RBC AUTO: 84.8 FL (ref 79–97)
MONOCYTES # BLD AUTO: 0.99 10*3/MM3 (ref 0.1–0.9)
MONOCYTES NFR BLD AUTO: 11.5 % (ref 5–12)
NEUTROPHILS NFR BLD AUTO: 4.65 10*3/MM3 (ref 1.7–7)
NEUTROPHILS NFR BLD AUTO: 53.9 % (ref 42.7–76)
NRBC BLD AUTO-RTO: 0 /100 WBC (ref 0–0.2)
PLATELET # BLD AUTO: 309 10*3/MM3 (ref 140–450)
PMV BLD AUTO: 10.3 FL (ref 6–12)
POTASSIUM SERPL-SCNC: 3.9 MMOL/L (ref 3.5–5.2)
PROT SERPL-MCNC: 7.2 G/DL (ref 6–8.5)
RBC # BLD AUTO: 4.93 10*6/MM3 (ref 4.14–5.8)
SODIUM SERPL-SCNC: 138 MMOL/L (ref 136–145)
T4 FREE SERPL-MCNC: 1.16 NG/DL (ref 0.93–1.7)
TRIGL SERPL-MCNC: 63 MG/DL (ref 0–150)
TSH SERPL DL<=0.05 MIU/L-ACNC: 2.31 UIU/ML (ref 0.27–4.2)
VIT B12 BLD-MCNC: 430 PG/ML (ref 211–946)
VLDLC SERPL-MCNC: 12 MG/DL (ref 5–40)
WBC # BLD AUTO: 8.63 10*3/MM3 (ref 3.4–10.8)

## 2021-01-11 ENCOUNTER — TELEPHONE (OUTPATIENT)
Dept: FAMILY MEDICINE CLINIC | Facility: CLINIC | Age: 41
End: 2021-01-11

## 2021-01-11 LAB — HBA1C MFR BLD: 5.1 % (ref 3.5–5.6)

## 2021-01-11 RX ORDER — TRIAMCINOLONE ACETONIDE 0.25 MG/G
OINTMENT TOPICAL 2 TIMES DAILY
Qty: 15 G | Refills: 0 | Status: SHIPPED | OUTPATIENT
Start: 2021-01-11 | End: 2021-01-21

## 2021-01-11 NOTE — TELEPHONE ENCOUNTER
Hub is instructed to read this note to patient.  Called patient. No answer. Per HIPAA, may leave detailed VM.  left advising pt that his shoulder x-ray was negative. Inquired in VM as to how his pain currently is, and if pain is still present, then he may need an MRI of his shoulder. He was also advised in the voicemail that his insurance would not cover the Eucrisa cream, but that an alternative cream was sent to the pharmacy. He was encouraged to return phone call to office to let us know about his shoulder pain.

## 2021-01-11 NOTE — TELEPHONE ENCOUNTER
----- Message from JASEN Perez sent at 1/11/2021  8:49 AM EST -----  Negative shoulder radiograph.  Ask how his pain is doing? If still presents needs a MRI of shoulder

## 2021-02-04 ENCOUNTER — TELEPHONE (OUTPATIENT)
Dept: FAMILY MEDICINE CLINIC | Facility: CLINIC | Age: 41
End: 2021-02-04

## 2021-02-04 NOTE — TELEPHONE ENCOUNTER
Hub is instructed to read this note to patient.    CALLED AND LEFT VM FOR PATIENT WE ARE NEEDING TO R/S HIS APPT DUE TO PROVIDER BEING OUT OF OFFICE ILL.

## 2021-02-21 RX ORDER — ERGOCALCIFEROL 1.25 MG/1
50000 CAPSULE ORAL
Qty: 8 CAPSULE | Refills: 0 | Status: SHIPPED | OUTPATIENT
Start: 2021-02-21 | End: 2021-04-12

## 2021-02-24 ENCOUNTER — TELEPHONE (OUTPATIENT)
Dept: FAMILY MEDICINE CLINIC | Facility: CLINIC | Age: 41
End: 2021-02-24

## 2021-02-25 ENCOUNTER — TELEPHONE (OUTPATIENT)
Dept: FAMILY MEDICINE CLINIC | Facility: CLINIC | Age: 41
End: 2021-02-25

## 2021-02-25 NOTE — TELEPHONE ENCOUNTER
Hub is instructed to read this note to patient.  CALLED PATIENT. NO ANSWER. PER HIPAA, MAY LEAVE DETAILED VM. VM LEFT ADVISING PT OF THE FOLLOWIN.) VIT D LEVEL LOW SO WEEKLY RX SENT TO PHARMACY   2.) INCREASE DAILY EXERCISE TO LOWER LDL (BAD CHOLESTEROL)  3.) LABS WILL BE REPEATED AT 3 MO FOLLOW UP

## 2021-02-25 NOTE — TELEPHONE ENCOUNTER
----- Message from JASEN Perez sent at 2/21/2021  8:40 PM EST -----  1. Vitamin D sent to pharmacy  2. Exercise daily to decrease LDL  3. Will repeat labs at 3 months f/u

## 2022-06-08 ENCOUNTER — HOSPITAL ENCOUNTER (EMERGENCY)
Facility: HOSPITAL | Age: 42
Discharge: HOME | End: 2022-06-08
Attending: EMERGENCY MEDICINE | Admitting: EMERGENCY MEDICINE

## 2022-06-08 ENCOUNTER — APPOINTMENT (OUTPATIENT)
Dept: CT IMAGING | Facility: HOSPITAL | Age: 42
End: 2022-06-08

## 2022-06-08 VITALS
HEIGHT: 68 IN | HEART RATE: 70 BPM | WEIGHT: 163.8 LBS | DIASTOLIC BLOOD PRESSURE: 73 MMHG | SYSTOLIC BLOOD PRESSURE: 104 MMHG | BODY MASS INDEX: 24.83 KG/M2 | TEMPERATURE: 97.7 F | OXYGEN SATURATION: 99 % | RESPIRATION RATE: 16 BRPM

## 2022-06-08 DIAGNOSIS — M54.50 ACUTE BILATERAL LOW BACK PAIN WITHOUT SCIATICA: ICD-10-CM

## 2022-06-08 DIAGNOSIS — R11.0 NAUSEA: ICD-10-CM

## 2022-06-08 DIAGNOSIS — R10.84 GENERALIZED ABDOMINAL PAIN: Primary | ICD-10-CM

## 2022-06-08 LAB
ALBUMIN SERPL-MCNC: 4.7 G/DL (ref 3.5–5.2)
ALBUMIN/GLOB SERPL: 2 G/DL
ALP SERPL-CCNC: 76 U/L (ref 39–117)
ALT SERPL W P-5'-P-CCNC: 22 U/L (ref 1–41)
ANION GAP SERPL CALCULATED.3IONS-SCNC: 12 MMOL/L (ref 5–15)
AST SERPL-CCNC: 20 U/L (ref 1–40)
BACTERIA UR QL AUTO: ABNORMAL /HPF
BASOPHILS # BLD AUTO: 0.1 10*3/MM3 (ref 0–0.2)
BASOPHILS NFR BLD AUTO: 1.3 % (ref 0–1.5)
BILIRUB SERPL-MCNC: 0.6 MG/DL (ref 0–1.2)
BILIRUB UR QL STRIP: NEGATIVE
BUN SERPL-MCNC: 17 MG/DL (ref 6–20)
BUN/CREAT SERPL: 20.2 (ref 7–25)
CALCIUM SPEC-SCNC: 9.5 MG/DL (ref 8.6–10.5)
CHLORIDE SERPL-SCNC: 100 MMOL/L (ref 98–107)
CLARITY UR: CLEAR
CO2 SERPL-SCNC: 23 MMOL/L (ref 22–29)
COLOR UR: YELLOW
CREAT SERPL-MCNC: 0.84 MG/DL (ref 0.76–1.27)
DEPRECATED RDW RBC AUTO: 39.4 FL (ref 37–54)
EGFRCR SERPLBLD CKD-EPI 2021: 113.1 ML/MIN/1.73
EOSINOPHIL # BLD AUTO: 1.5 10*3/MM3 (ref 0–0.4)
EOSINOPHIL NFR BLD AUTO: 15 % (ref 0.3–6.2)
ERYTHROCYTE [DISTWIDTH] IN BLOOD BY AUTOMATED COUNT: 13.5 % (ref 12.3–15.4)
GLOBULIN UR ELPH-MCNC: 2.4 GM/DL
GLUCOSE SERPL-MCNC: 91 MG/DL (ref 65–99)
GLUCOSE UR STRIP-MCNC: NEGATIVE MG/DL
HCT VFR BLD AUTO: 44.7 % (ref 37.5–51)
HGB BLD-MCNC: 15.3 G/DL (ref 13–17.7)
HGB UR QL STRIP.AUTO: ABNORMAL
HYALINE CASTS UR QL AUTO: ABNORMAL /LPF
KETONES UR QL STRIP: NEGATIVE
LEUKOCYTE ESTERASE UR QL STRIP.AUTO: NEGATIVE
LIPASE SERPL-CCNC: 21 U/L (ref 13–60)
LYMPHOCYTES # BLD AUTO: 2 10*3/MM3 (ref 0.7–3.1)
LYMPHOCYTES NFR BLD AUTO: 19.6 % (ref 19.6–45.3)
MAGNESIUM SERPL-MCNC: 2.1 MG/DL (ref 1.6–2.6)
MCH RBC QN AUTO: 28.6 PG (ref 26.6–33)
MCHC RBC AUTO-ENTMCNC: 34.3 G/DL (ref 31.5–35.7)
MCV RBC AUTO: 83.3 FL (ref 79–97)
MONOCYTES # BLD AUTO: 0.7 10*3/MM3 (ref 0.1–0.9)
MONOCYTES NFR BLD AUTO: 7.3 % (ref 5–12)
NEUTROPHILS NFR BLD AUTO: 5.8 10*3/MM3 (ref 1.7–7)
NEUTROPHILS NFR BLD AUTO: 56.8 % (ref 42.7–76)
NITRITE UR QL STRIP: NEGATIVE
NRBC BLD AUTO-RTO: 0.1 /100 WBC (ref 0–0.2)
PH UR STRIP.AUTO: 5.5 [PH] (ref 5–8)
PLATELET # BLD AUTO: 310 10*3/MM3 (ref 140–450)
PMV BLD AUTO: 7.8 FL (ref 6–12)
POTASSIUM SERPL-SCNC: 4.5 MMOL/L (ref 3.5–5.2)
PROT SERPL-MCNC: 7.1 G/DL (ref 6–8.5)
PROT UR QL STRIP: NEGATIVE
RBC # BLD AUTO: 5.36 10*6/MM3 (ref 4.14–5.8)
RBC # UR STRIP: ABNORMAL /HPF
REF LAB TEST METHOD: ABNORMAL
SODIUM SERPL-SCNC: 135 MMOL/L (ref 136–145)
SP GR UR STRIP: 1.02 (ref 1–1.03)
SQUAMOUS #/AREA URNS HPF: ABNORMAL /HPF
UROBILINOGEN UR QL STRIP: ABNORMAL
WBC # UR STRIP: ABNORMAL /HPF
WBC NRBC COR # BLD: 10.2 10*3/MM3 (ref 3.4–10.8)

## 2022-06-08 PROCEDURE — 85025 COMPLETE CBC W/AUTO DIFF WBC: CPT

## 2022-06-08 PROCEDURE — 80053 COMPREHEN METABOLIC PANEL: CPT

## 2022-06-08 PROCEDURE — 96374 THER/PROPH/DIAG INJ IV PUSH: CPT

## 2022-06-08 PROCEDURE — 25010000002 ONDANSETRON PER 1 MG

## 2022-06-08 PROCEDURE — 96375 TX/PRO/DX INJ NEW DRUG ADDON: CPT

## 2022-06-08 PROCEDURE — 83690 ASSAY OF LIPASE: CPT

## 2022-06-08 PROCEDURE — 81001 URINALYSIS AUTO W/SCOPE: CPT

## 2022-06-08 PROCEDURE — 99283 EMERGENCY DEPT VISIT LOW MDM: CPT

## 2022-06-08 PROCEDURE — 0 IOPAMIDOL PER 1 ML: Performed by: EMERGENCY MEDICINE

## 2022-06-08 PROCEDURE — 83735 ASSAY OF MAGNESIUM: CPT

## 2022-06-08 PROCEDURE — 74177 CT ABD & PELVIS W/CONTRAST: CPT

## 2022-06-08 RX ORDER — PANTOPRAZOLE SODIUM 40 MG/10ML
40 INJECTION, POWDER, LYOPHILIZED, FOR SOLUTION INTRAVENOUS
Status: DISCONTINUED | OUTPATIENT
Start: 2022-06-08 | End: 2022-06-08 | Stop reason: HOSPADM

## 2022-06-08 RX ORDER — ONDANSETRON 4 MG/1
4 TABLET, ORALLY DISINTEGRATING ORAL EVERY 8 HOURS PRN
Qty: 12 TABLET | Refills: 0 | Status: SHIPPED | OUTPATIENT
Start: 2022-06-08 | End: 2022-06-12

## 2022-06-08 RX ORDER — SODIUM CHLORIDE 0.9 % (FLUSH) 0.9 %
10 SYRINGE (ML) INJECTION AS NEEDED
Status: DISCONTINUED | OUTPATIENT
Start: 2022-06-08 | End: 2022-06-08 | Stop reason: HOSPADM

## 2022-06-08 RX ORDER — ONDANSETRON 2 MG/ML
4 INJECTION INTRAMUSCULAR; INTRAVENOUS ONCE
Status: COMPLETED | OUTPATIENT
Start: 2022-06-08 | End: 2022-06-08

## 2022-06-08 RX ADMIN — SODIUM CHLORIDE 1000 ML: 9 INJECTION, SOLUTION INTRAVENOUS at 15:02

## 2022-06-08 RX ADMIN — PANTOPRAZOLE SODIUM 40 MG: 40 INJECTION, POWDER, FOR SOLUTION INTRAVENOUS at 15:02

## 2022-06-08 RX ADMIN — IOPAMIDOL 100 ML: 755 INJECTION, SOLUTION INTRAVENOUS at 15:48

## 2022-06-08 RX ADMIN — ONDANSETRON 4 MG: 2 INJECTION INTRAMUSCULAR; INTRAVENOUS at 15:02

## 2022-06-08 NOTE — ED PROVIDER NOTES
Subjective   Chief Complaint: Abdominal pain    Context: Patient is a 41-year-old  male who presents today with complaints of generalized abdominal pain and back pain ongoing since Saturday.  He states that his abdominal pain began in his upper region but states now it is generalized around his abdomen with radiation to his back.  He states he had nausea last night but has not had any vomiting.  He states that he has not had a bowel movement in at least 5 days but is unsure of the exact time.  He currently rates his pain 7/10 describes it as an aching.  He states the pain feels like it starts in his upper abdomen and radiates down his abdomen and around both sides to his back.  He cannot identify any relieving or aggravating factors.  He is tried taking stool softeners, MiraLAX for constipation with no relief.  He states that last year he was seen after having blood in his stool but was diagnosed with hemorrhoids.  He states that he has not noticed change in stool appearance.  He states he had an issue similar to this 67 years ago and was diagnosed with a bowel blockage.  He has not had abdominal issues since that time.  He reports having no known drug allergies.    Duration: 4 days    Timing: Waxes and wanes    Severity: 7/10    Associated: Back pain, nausea          Review of Systems   Constitutional: Negative for chills, fatigue and fever.   HENT: Negative for congestion, rhinorrhea and sore throat.    Eyes: Negative.    Respiratory: Negative for cough, chest tightness and shortness of breath.    Cardiovascular: Negative for chest pain and palpitations.   Gastrointestinal: Positive for abdominal pain, constipation and nausea. Negative for anal bleeding, blood in stool, diarrhea and vomiting.   Endocrine: Negative.    Genitourinary: Negative for dysuria, flank pain and urgency.   Musculoskeletal: Positive for back pain. Negative for arthralgias and myalgias.   Skin: Negative.    Allergic/Immunologic:  Negative.    Neurological: Negative for dizziness, syncope, weakness and headaches.   Hematological: Negative.    Psychiatric/Behavioral: Negative for confusion. The patient is not nervous/anxious.    All other systems reviewed and are negative.      Past Medical History:   Diagnosis Date   • GERD (gastroesophageal reflux disease)        No Known Allergies    Past Surgical History:   Procedure Laterality Date   • ABDOMINAL SURGERY     • COLONOSCOPY         Family History   Problem Relation Age of Onset   • No Known Problems Mother        Social History     Socioeconomic History   • Marital status:    Tobacco Use   • Smoking status: Former Smoker     Packs/day: 2.00     Years: 21.00     Pack years: 42.00     Types: Cigarettes     Start date: 1998     Quit date: 2019     Years since quitting: 3.4   • Smokeless tobacco: Never Used   Substance and Sexual Activity   • Alcohol use: No   • Drug use: No   • Sexual activity: Yes     Partners: Female           Objective   Physical Exam  Vitals and nursing note reviewed.   Constitutional:       General: He is not in acute distress.     Appearance: Normal appearance. He is not ill-appearing.   HENT:      Head: Normocephalic and atraumatic.   Eyes:      Extraocular Movements: Extraocular movements intact.      Pupils: Pupils are equal, round, and reactive to light.   Cardiovascular:      Rate and Rhythm: Normal rate and regular rhythm.      Pulses: Normal pulses.      Heart sounds: Normal heart sounds. No murmur heard.  Pulmonary:      Effort: Pulmonary effort is normal. No respiratory distress.      Breath sounds: Normal breath sounds.   Abdominal:      General: Abdomen is flat. Bowel sounds are decreased. There is no distension.      Palpations: Abdomen is soft.      Tenderness: There is generalized abdominal tenderness. There is no right CVA tenderness or left CVA tenderness. Negative signs include Rothman's sign.      Hernia: No hernia is present.      Comments:  "Generalized tenderness, patient states tenderness worse in upper regions.   Genitourinary:     Prostate: Normal.   Musculoskeletal:         General: Normal range of motion.      Cervical back: Normal range of motion and neck supple.   Skin:     General: Skin is warm and dry.      Capillary Refill: Capillary refill takes less than 2 seconds.   Neurological:      General: No focal deficit present.      Mental Status: He is alert and oriented to person, place, and time.      GCS: GCS eye subscore is 4. GCS verbal subscore is 5. GCS motor subscore is 6.      Sensory: Sensation is intact.      Motor: Motor function is intact.      Deep Tendon Reflexes: Reflexes are normal and symmetric.   Psychiatric:         Mood and Affect: Mood normal. Mood is not anxious.         Behavior: Behavior normal.         Procedures           ED Course      /72   Pulse 74   Temp 97.7 °F (36.5 °C) (Oral)   Resp 16   Ht 172.7 cm (68\")   Wt 74.3 kg (163 lb 12.8 oz)   SpO2 99%   BMI 24.91 kg/m²   Labs Reviewed   COMPREHENSIVE METABOLIC PANEL - Abnormal; Notable for the following components:       Result Value    Sodium 135 (*)     All other components within normal limits    Narrative:     GFR Normal >60  Chronic Kidney Disease <60  Kidney Failure <15     URINALYSIS W/ MICROSCOPIC IF INDICATED (NO CULTURE) - Abnormal; Notable for the following components:    Blood, UA Trace (*)     All other components within normal limits   CBC WITH AUTO DIFFERENTIAL - Abnormal; Notable for the following components:    Eosinophil % 15.0 (*)     Eosinophils, Absolute 1.50 (*)     All other components within normal limits   URINALYSIS, MICROSCOPIC ONLY - Abnormal; Notable for the following components:    RBC, UA 0-2 (*)     WBC, UA 0-2 (*)     All other components within normal limits   LIPASE - Normal   MAGNESIUM - Normal   CBC AND DIFFERENTIAL    Narrative:     The following orders were created for panel order CBC & Differential.  Procedure         "                       Abnormality         Status                     ---------                               -----------         ------                     CBC Auto Differential[[id number]]        Abnormal            Final result                 Please view results for these tests on the individual orders.     Medications   sodium chloride 0.9 % flush 10 mL (has no administration in time range)   pantoprazole (PROTONIX) injection 40 mg (40 mg Intravenous Given 6/8/22 1502)   sodium chloride 0.9 % bolus 1,000 mL (1,000 mL Intravenous New Bag 6/8/22 1502)   ondansetron (ZOFRAN) injection 4 mg (4 mg Intravenous Given 6/8/22 1502)   iopamidol (ISOVUE-370) 76 % injection 100 mL (100 mL Intravenous Given 6/8/22 1548)     CT Abdomen Pelvis With Contrast    Result Date: 6/8/2022   1. No acute findings in the abdomen or pelvis.    Electronically Signed By-Tabby Medley MD On:6/8/2022 3:56 PM This report was finalized on [id number] by  Tabby Medley MD.                                               MDM  Number of Diagnoses or Management Options  Acute bilateral low back pain without sciatica  Generalized abdominal pain  Nausea  Diagnosis management comments: Patient was placed in a gown prior to assessment.  Upon assessment, he is alert, nontoxic-appearing and stable.  He is sitting on the stretcher speaking to family at bedside.  IV was established and blood work was obtained.  He was medicated with Protonix, Zofran and received a fluid bolus.  He was sent to CT for abdominal scan.    Chart Review: Patient was last seen in the ER 10/5/2020 for COVID-like symptoms.  He was diagnosed COVID at that time and discharged.    Comorbidities: History of bowel depression, hypertension, hyperlipidemia, GERD  Differentials: Pancreatitis, acute cholecystitis, constipation, bowel obstruction.  Not all inclusive of differentials considered.     Lab Interpretation: As above  Radiology Interpretation: As above    Appropriate PPE worn  during exam.    Upon reassessment, patient reports improvement in symptoms after receiving medication and fluids.  I discussed results with him and suggested GI follow-up if not improving.  Patient verbalized understanding states he here for discharge at this time.  He will be discharged with an order of Zofran to take as needed for nausea.  I discussed the findings with patient who voices understanding of discharge instructions, signs and symptoms requiring return to the ED; discharged improved and stable condition with follow-up for reevaluation.    Patient is aware that discharge does not mean that nothing is wrong but it indicates no emergency is present and they must continue care with follow-up as given below or physician of their choice.    This document is intended for medical expert use only.  Reading of this document by patients and/or patient's family without participating medical staff guidance may result in misinterpretation and unintended morbidity.  Any interpretation of such data is the responsibility of the patient and/or family member responsible for the patient in concert with their primary or specialist providers, not to be left for sources of online search as such as [employer], [employer] or similar queries.  Relying on these approaches to knowledge may result in misinterpretation, misguided goals of care and even death should patient or family members try recommendations outside of the realm of professional medical care in a supervised inpatient environment.       Amount and/or Complexity of Data Reviewed  Clinical lab tests: reviewed and ordered  Tests in the radiology section of CPT®: reviewed and ordered  Obtain history from someone other than the patient: yes (Family at bedside)    Risk of Complications, Morbidity, and/or Mortality  Presenting problems: high  Diagnostic procedures: high  Management options: high    Patient Progress  Patient progress: improved      Final diagnoses:   Generalized  abdominal pain   Acute bilateral low back pain without sciatica   Nausea       ED Disposition  ED Disposition     ED Disposition   Discharge    Condition   Stable    Comment   --             Kisha Linares, APRN  [address] [facility] [place] [address]  [phone]          [facility]  [address] [facility] [address] [phone]    As needed, If symptoms worsen         Medication List      New Prescriptions    ondansetron ODT 4 MG disintegrating tablet  Commonly known as: ZOFRAN-ODT  Place 1 tablet on the tongue Every 8 (Eight) Hours As Needed for Nausea or Vomiting for up to 4 days.           Where to Get Your Medications      These medications were sent to [facility] [address] [place] [address] [place] - [address] [facility] - [phone]  - [phone] FX  [address] [facility] [place] [address]    Phone: [phone]   · ondansetron ODT 4 MG disintegrating tablet          Mira Gordillo, APRN  06/08/22 9551

## 2022-06-08 NOTE — DISCHARGE INSTRUCTIONS
As discussed, take Zofran as needed for nausea over the next couple days.  Be sure you are drinking plenty of fluids and avoiding dehydrating compounds like caffeine, coffee and tea.  Rest.      Follow-up with GI or your primary care provider for further evaluation.    Return to the ER for any new or worsening symptoms.

## 2022-06-19 ENCOUNTER — HOSPITAL ENCOUNTER (OUTPATIENT)
Facility: HOSPITAL | Age: 42
Discharge: HOME | End: 2022-06-22
Attending: EMERGENCY MEDICINE | Admitting: INTERNAL MEDICINE

## 2022-06-19 ENCOUNTER — ON CAMPUS - OUTPATIENT (OUTPATIENT)
Dept: URBAN - METROPOLITAN AREA HOSPITAL 85 | Facility: HOSPITAL | Age: 42
End: 2022-06-19

## 2022-06-19 ENCOUNTER — APPOINTMENT (OUTPATIENT)
Dept: GENERAL RADIOLOGY | Facility: HOSPITAL | Age: 42
End: 2022-06-19

## 2022-06-19 ENCOUNTER — APPOINTMENT (OUTPATIENT)
Dept: CT IMAGING | Facility: HOSPITAL | Age: 42
End: 2022-06-19

## 2022-06-19 DIAGNOSIS — R11.0 NAUSEA: ICD-10-CM

## 2022-06-19 DIAGNOSIS — R14.0 ABDOMINAL DISTENSION (GASEOUS): ICD-10-CM

## 2022-06-19 DIAGNOSIS — D72.10 EOSINOPHILIA, UNSPECIFIED TYPE: ICD-10-CM

## 2022-06-19 DIAGNOSIS — R10.10 UPPER ABDOMINAL PAIN, UNSPECIFIED: ICD-10-CM

## 2022-06-19 DIAGNOSIS — D72.829 ELEVATED WHITE BLOOD CELL COUNT, UNSPECIFIED: ICD-10-CM

## 2022-06-19 DIAGNOSIS — K56.609 UNSPECIFIED INTESTINAL OBSTRUCTION, UNSPECIFIED AS TO PARTIA: ICD-10-CM

## 2022-06-19 DIAGNOSIS — R10.9 ABDOMINAL PAIN, UNSPECIFIED ABDOMINAL LOCATION: Primary | ICD-10-CM

## 2022-06-19 DIAGNOSIS — K56.609 SBO (SMALL BOWEL OBSTRUCTION): ICD-10-CM

## 2022-06-19 LAB
ALBUMIN SERPL-MCNC: 4.3 G/DL (ref 3.5–5.2)
ALBUMIN/GLOB SERPL: 1.7 G/DL
ALP SERPL-CCNC: 59 U/L (ref 39–117)
ALT SERPL W P-5'-P-CCNC: 15 U/L (ref 1–41)
ANION GAP SERPL CALCULATED.3IONS-SCNC: 12 MMOL/L (ref 5–15)
AST SERPL-CCNC: 16 U/L (ref 1–40)
BASOPHILS # BLD AUTO: 0.1 10*3/MM3 (ref 0–0.2)
BASOPHILS NFR BLD AUTO: 0.7 % (ref 0–1.5)
BILIRUB SERPL-MCNC: 0.5 MG/DL (ref 0–1.2)
BILIRUB UR QL STRIP: NEGATIVE
BUN SERPL-MCNC: 14 MG/DL (ref 6–20)
BUN/CREAT SERPL: 16.1 (ref 7–25)
C3 SERPL-MCNC: 106 MG/DL (ref 82–167)
C4 SERPL-MCNC: 22 MG/DL (ref 14–44)
CALCIUM SPEC-SCNC: 8.7 MG/DL (ref 8.6–10.5)
CHLORIDE SERPL-SCNC: 104 MMOL/L (ref 98–107)
CLARITY UR: ABNORMAL
CO2 SERPL-SCNC: 21 MMOL/L (ref 22–29)
COLOR UR: YELLOW
CREAT SERPL-MCNC: 0.87 MG/DL (ref 0.76–1.27)
CRP SERPL-MCNC: 0.4 MG/DL (ref 0–0.5)
DEPRECATED RDW RBC AUTO: 40.3 FL (ref 37–54)
EGFRCR SERPLBLD CKD-EPI 2021: 111.9 ML/MIN/1.73
EOSINOPHIL # BLD AUTO: 3.5 10*3/MM3 (ref 0–0.4)
EOSINOPHIL NFR BLD AUTO: 24.1 % (ref 0.3–6.2)
ERYTHROCYTE [DISTWIDTH] IN BLOOD BY AUTOMATED COUNT: 13.9 % (ref 12.3–15.4)
ERYTHROCYTE [SEDIMENTATION RATE] IN BLOOD: 2 MM/HR (ref 0–15)
GLOBULIN UR ELPH-MCNC: 2.5 GM/DL
GLUCOSE SERPL-MCNC: 98 MG/DL (ref 65–99)
GLUCOSE UR STRIP-MCNC: NEGATIVE MG/DL
HCT VFR BLD AUTO: 39 % (ref 37.5–51)
HGB BLD-MCNC: 13.6 G/DL (ref 13–17.7)
HGB UR QL STRIP.AUTO: NEGATIVE
IRON 24H UR-MRATE: 42 MCG/DL (ref 59–158)
IRON SATN MFR SERPL: 13 % (ref 20–50)
KETONES UR QL STRIP: NEGATIVE
LEUKOCYTE ESTERASE UR QL STRIP.AUTO: NEGATIVE
LIPASE SERPL-CCNC: 33 U/L (ref 13–60)
LYMPHOCYTES # BLD AUTO: 2.3 10*3/MM3 (ref 0.7–3.1)
LYMPHOCYTES NFR BLD AUTO: 16 % (ref 19.6–45.3)
MCH RBC QN AUTO: 28.8 PG (ref 26.6–33)
MCHC RBC AUTO-ENTMCNC: 35 G/DL (ref 31.5–35.7)
MCV RBC AUTO: 82.2 FL (ref 79–97)
MONOCYTES # BLD AUTO: 0.9 10*3/MM3 (ref 0.1–0.9)
MONOCYTES NFR BLD AUTO: 6.4 % (ref 5–12)
NEUTROPHILS NFR BLD AUTO: 52.8 % (ref 42.7–76)
NEUTROPHILS NFR BLD AUTO: 7.6 10*3/MM3 (ref 1.7–7)
NITRITE UR QL STRIP: NEGATIVE
NRBC BLD AUTO-RTO: 0 /100 WBC (ref 0–0.2)
PH UR STRIP.AUTO: 8 [PH] (ref 5–8)
PLATELET # BLD AUTO: 288 10*3/MM3 (ref 140–450)
PMV BLD AUTO: 7.7 FL (ref 6–12)
POTASSIUM SERPL-SCNC: 3.9 MMOL/L (ref 3.5–5.2)
PROT SERPL-MCNC: 6.8 G/DL (ref 6–8.5)
PROT UR QL STRIP: NEGATIVE
RBC # BLD AUTO: 4.74 10*6/MM3 (ref 4.14–5.8)
SARS-COV-2 RNA PNL SPEC NAA+PROBE: NOT DETECTED
SODIUM SERPL-SCNC: 137 MMOL/L (ref 136–145)
SP GR UR STRIP: 1.02 (ref 1–1.03)
TIBC SERPL-MCNC: 313 MCG/DL (ref 298–536)
TRANSFERRIN SERPL-MCNC: 210 MG/DL (ref 200–360)
TROPONIN T SERPL-MCNC: <0.01 NG/ML (ref 0–0.03)
UROBILINOGEN UR QL STRIP: ABNORMAL
VIT B12 BLD-MCNC: 472 PG/ML (ref 211–946)
WBC NRBC COR # BLD: 14.4 10*3/MM3 (ref 3.4–10.8)

## 2022-06-19 PROCEDURE — 99220 PR INITIAL OBSERVATION CARE/DAY 70 MINUTES: CPT | Performed by: INTERNAL MEDICINE

## 2022-06-19 PROCEDURE — 99284 EMERGENCY DEPT VISIT MOD MDM: CPT

## 2022-06-19 PROCEDURE — G0378 HOSPITAL OBSERVATION PER HR: HCPCS

## 2022-06-19 PROCEDURE — 88185 FLOWCYTOMETRY/TC ADD-ON: CPT

## 2022-06-19 PROCEDURE — 25010000002 MORPHINE PER 10 MG: Performed by: INTERNAL MEDICINE

## 2022-06-19 PROCEDURE — 84466 ASSAY OF TRANSFERRIN: CPT | Performed by: INTERNAL MEDICINE

## 2022-06-19 PROCEDURE — 83690 ASSAY OF LIPASE: CPT | Performed by: EMERGENCY MEDICINE

## 2022-06-19 PROCEDURE — 99204 OFFICE O/P NEW MOD 45 MIN: CPT | Performed by: SURGERY

## 2022-06-19 PROCEDURE — 85652 RBC SED RATE AUTOMATED: CPT | Performed by: INTERNAL MEDICINE

## 2022-06-19 PROCEDURE — 96375 TX/PRO/DX INJ NEW DRUG ADDON: CPT

## 2022-06-19 PROCEDURE — 36415 COLL VENOUS BLD VENIPUNCTURE: CPT | Performed by: INTERNAL MEDICINE

## 2022-06-19 PROCEDURE — 93005 ELECTROCARDIOGRAM TRACING: CPT | Performed by: EMERGENCY MEDICINE

## 2022-06-19 PROCEDURE — 25010000002 MORPHINE PER 10 MG: Performed by: EMERGENCY MEDICINE

## 2022-06-19 PROCEDURE — 0 IOPAMIDOL PER 1 ML: Performed by: EMERGENCY MEDICINE

## 2022-06-19 PROCEDURE — 86140 C-REACTIVE PROTEIN: CPT | Performed by: INTERNAL MEDICINE

## 2022-06-19 PROCEDURE — 25010000002 KETOROLAC TROMETHAMINE PER 15 MG: Performed by: INTERNAL MEDICINE

## 2022-06-19 PROCEDURE — 81003 URINALYSIS AUTO W/O SCOPE: CPT | Performed by: EMERGENCY MEDICINE

## 2022-06-19 PROCEDURE — 82607 VITAMIN B-12: CPT | Performed by: INTERNAL MEDICINE

## 2022-06-19 PROCEDURE — 82787 IGG 1 2 3 OR 4 EACH: CPT | Performed by: INTERNAL MEDICINE

## 2022-06-19 PROCEDURE — 96365 THER/PROPH/DIAG IV INF INIT: CPT

## 2022-06-19 PROCEDURE — 86160 COMPLEMENT ANTIGEN: CPT | Performed by: INTERNAL MEDICINE

## 2022-06-19 PROCEDURE — 83540 ASSAY OF IRON: CPT | Performed by: INTERNAL MEDICINE

## 2022-06-19 PROCEDURE — 85025 COMPLETE CBC W/AUTO DIFF WBC: CPT | Performed by: EMERGENCY MEDICINE

## 2022-06-19 PROCEDURE — C9803 HOPD COVID-19 SPEC COLLECT: HCPCS

## 2022-06-19 PROCEDURE — 80053 COMPREHEN METABOLIC PANEL: CPT | Performed by: EMERGENCY MEDICINE

## 2022-06-19 PROCEDURE — 74022 RADEX COMPL AQT ABD SERIES: CPT

## 2022-06-19 PROCEDURE — 82785 ASSAY OF IGE: CPT | Performed by: INTERNAL MEDICINE

## 2022-06-19 PROCEDURE — 96361 HYDRATE IV INFUSION ADD-ON: CPT

## 2022-06-19 PROCEDURE — 88275 CYTOGENETICS 100-300: CPT

## 2022-06-19 PROCEDURE — 88237 TISSUE CULTURE BONE MARROW: CPT

## 2022-06-19 PROCEDURE — 84484 ASSAY OF TROPONIN QUANT: CPT | Performed by: EMERGENCY MEDICINE

## 2022-06-19 PROCEDURE — 25010000002 PIPERACILLIN SOD-TAZOBACTAM PER 1 G: Performed by: EMERGENCY MEDICINE

## 2022-06-19 PROCEDURE — 96376 TX/PRO/DX INJ SAME DRUG ADON: CPT

## 2022-06-19 PROCEDURE — 99214 OFFICE O/P EST MOD 30 MIN: CPT | Performed by: NURSE PRACTITIONER

## 2022-06-19 PROCEDURE — 87635 SARS-COV-2 COVID-19 AMP PRB: CPT | Performed by: INTERNAL MEDICINE

## 2022-06-19 PROCEDURE — 83520 IMMUNOASSAY QUANT NOS NONAB: CPT | Performed by: INTERNAL MEDICINE

## 2022-06-19 PROCEDURE — 25010000002 ONDANSETRON PER 1 MG: Performed by: EMERGENCY MEDICINE

## 2022-06-19 PROCEDURE — 88184 FLOWCYTOMETRY/ TC 1 MARKER: CPT

## 2022-06-19 PROCEDURE — 74177 CT ABD & PELVIS W/CONTRAST: CPT

## 2022-06-19 PROCEDURE — 82784 ASSAY IGA/IGD/IGG/IGM EACH: CPT | Performed by: INTERNAL MEDICINE

## 2022-06-19 PROCEDURE — 88271 CYTOGENETICS DNA PROBE: CPT

## 2022-06-19 RX ORDER — PANTOPRAZOLE SODIUM 40 MG/10ML
40 INJECTION, POWDER, LYOPHILIZED, FOR SOLUTION INTRAVENOUS
Status: DISCONTINUED | OUTPATIENT
Start: 2022-06-20 | End: 2022-06-22 | Stop reason: HOSPADM

## 2022-06-19 RX ORDER — SODIUM CHLORIDE 0.9 % (FLUSH) 0.9 %
10 SYRINGE (ML) INJECTION AS NEEDED
Status: DISCONTINUED | OUTPATIENT
Start: 2022-06-19 | End: 2022-06-22 | Stop reason: HOSPADM

## 2022-06-19 RX ORDER — BISACODYL 10 MG
10 SUPPOSITORY, RECTAL RECTAL DAILY PRN
Status: DISCONTINUED | OUTPATIENT
Start: 2022-06-19 | End: 2022-06-22 | Stop reason: HOSPADM

## 2022-06-19 RX ORDER — SODIUM CHLORIDE 9 MG/ML
125 INJECTION, SOLUTION INTRAVENOUS CONTINUOUS
Status: DISCONTINUED | OUTPATIENT
Start: 2022-06-19 | End: 2022-06-22 | Stop reason: HOSPADM

## 2022-06-19 RX ORDER — AMOXICILLIN 250 MG
2 CAPSULE ORAL 2 TIMES DAILY
Status: DISCONTINUED | OUTPATIENT
Start: 2022-06-19 | End: 2022-06-22 | Stop reason: HOSPADM

## 2022-06-19 RX ORDER — ONDANSETRON 4 MG/1
4 TABLET, FILM COATED ORAL EVERY 6 HOURS PRN
Status: DISCONTINUED | OUTPATIENT
Start: 2022-06-19 | End: 2022-06-22 | Stop reason: HOSPADM

## 2022-06-19 RX ORDER — PANTOPRAZOLE SODIUM 40 MG/10ML
40 INJECTION, POWDER, LYOPHILIZED, FOR SOLUTION INTRAVENOUS ONCE
Status: COMPLETED | OUTPATIENT
Start: 2022-06-19 | End: 2022-06-19

## 2022-06-19 RX ORDER — KETOROLAC TROMETHAMINE 30 MG/ML
30 INJECTION, SOLUTION INTRAMUSCULAR; INTRAVENOUS EVERY 6 HOURS PRN
Status: COMPLETED | OUTPATIENT
Start: 2022-06-19 | End: 2022-06-21

## 2022-06-19 RX ORDER — MORPHINE SULFATE 2 MG/ML
1 INJECTION, SOLUTION INTRAMUSCULAR; INTRAVENOUS EVERY 4 HOURS PRN
Status: DISCONTINUED | OUTPATIENT
Start: 2022-06-19 | End: 2022-06-22 | Stop reason: HOSPADM

## 2022-06-19 RX ORDER — NALOXONE HCL 0.4 MG/ML
0.4 VIAL (ML) INJECTION
Status: DISCONTINUED | OUTPATIENT
Start: 2022-06-19 | End: 2022-06-22 | Stop reason: HOSPADM

## 2022-06-19 RX ORDER — POLYETHYLENE GLYCOL 3350 17 G/17G
17 POWDER, FOR SOLUTION ORAL DAILY PRN
Status: DISCONTINUED | OUTPATIENT
Start: 2022-06-19 | End: 2022-06-22 | Stop reason: HOSPADM

## 2022-06-19 RX ORDER — ONDANSETRON 2 MG/ML
4 INJECTION INTRAMUSCULAR; INTRAVENOUS ONCE
Status: COMPLETED | OUTPATIENT
Start: 2022-06-19 | End: 2022-06-19

## 2022-06-19 RX ORDER — BISACODYL 5 MG/1
5 TABLET, DELAYED RELEASE ORAL DAILY PRN
Status: DISCONTINUED | OUTPATIENT
Start: 2022-06-19 | End: 2022-06-22 | Stop reason: HOSPADM

## 2022-06-19 RX ORDER — ONDANSETRON 2 MG/ML
4 INJECTION INTRAMUSCULAR; INTRAVENOUS EVERY 6 HOURS PRN
Status: DISCONTINUED | OUTPATIENT
Start: 2022-06-19 | End: 2022-06-22 | Stop reason: HOSPADM

## 2022-06-19 RX ADMIN — IOPAMIDOL 100 ML: 755 INJECTION, SOLUTION INTRAVENOUS at 10:22

## 2022-06-19 RX ADMIN — SODIUM CHLORIDE 125 ML/HR: 9 INJECTION, SOLUTION INTRAVENOUS at 08:33

## 2022-06-19 RX ADMIN — KETOROLAC TROMETHAMINE 30 MG: 30 INJECTION, SOLUTION INTRAMUSCULAR at 17:57

## 2022-06-19 RX ADMIN — SENNOSIDES AND DOCUSATE SODIUM 2 TABLET: 50; 8.6 TABLET ORAL at 20:47

## 2022-06-19 RX ADMIN — MORPHINE SULFATE 4 MG: 4 INJECTION, SOLUTION INTRAMUSCULAR; INTRAVENOUS at 06:18

## 2022-06-19 RX ADMIN — KETOROLAC TROMETHAMINE 30 MG: 30 INJECTION, SOLUTION INTRAMUSCULAR at 11:00

## 2022-06-19 RX ADMIN — PANTOPRAZOLE SODIUM 40 MG: 40 INJECTION, POWDER, FOR SOLUTION INTRAVENOUS at 06:18

## 2022-06-19 RX ADMIN — ONDANSETRON 4 MG: 2 INJECTION INTRAMUSCULAR; INTRAVENOUS at 06:18

## 2022-06-19 RX ADMIN — MORPHINE SULFATE 1 MG: 2 INJECTION, SOLUTION INTRAMUSCULAR; INTRAVENOUS at 20:47

## 2022-06-19 RX ADMIN — PIPERACILLIN AND TAZOBACTAM 3.38 G: 3; .375 INJECTION, POWDER, LYOPHILIZED, FOR SOLUTION INTRAVENOUS at 08:33

## 2022-06-19 NOTE — H&P
[facility] Medicine Services      Patient Name: Gerardo Hyatt  : 1980  MRN: [id number]  Primary Care Physician:  Kisha Linares APRN  Date of admission: 2022      Subjective      Chief Complaint: Abdominal pain    History of Present Illness: Gerardo Hyatt is a 41 y.o. male who presented to [facility] on 2022 complaining of abdominal pain which radiates across his entire upper abdomen and through to his back. He has had some associated nausea, but denies any vomiting, diarrhea, fever or chills.  He has noticed a decrease in his stool output over the past few days and notes that his stool is very soft. Pt states his stool always varies in consistency.  He had been here about 10 days ago and his work-up was negative. This time his symptoms were much worse. He had a similar episode about 7-8yrs ago and was diagnosed with an SBO and ruled out for Crohn's disease.     Other than seasonal allergies, eczema, and GERD he has no significant past medical history. No risk factors for Hep B, Hep C, or HIV.      Review of Systems   Constitutional: Negative for chills, decreased appetite, fever and weight loss.   HENT: Positive for congestion (sinusitis).    Eyes: Negative.    Cardiovascular: Negative.  Negative for chest pain, dyspnea on exertion, leg swelling, palpitations and syncope.   Respiratory: Negative.  Negative for cough, shortness of breath and wheezing.    Skin:        Pt has eczema    Musculoskeletal: Positive for back pain. Negative for joint pain.   Gastrointestinal: Positive for bloating, abdominal pain and change in bowel habit (decreased stool output over past few days). Negative for constipation, diarrhea, nausea and vomiting.   Genitourinary: Negative.    Neurological: Negative.    Psychiatric/Behavioral: Negative.    Allergic/Immunologic: Positive for environmental allergies. Negative for HIV exposure and persistent infections.   All other systems  reviewed and are negative.      Personal History     Past Medical History:   Diagnosis Date   • GERD (gastroesophageal reflux disease)        Past Surgical History:   Procedure Laterality Date   • ABDOMINAL SURGERY     • COLONOSCOPY         Family History: family history includes No Known Problems in his mother. Otherwise FHx was reviewed and not pertinent to current issue.    Social History:  reports that he quit smoking about 3 years ago. His smoking use included cigarettes. He started smoking about 24 years ago. He has a 42.00 pack-year smoking history. He has never used smokeless tobacco. He reports that he does not drink alcohol and does not use drugs.    Home Medications:  Prior to Admission Medications     Prescriptions Last Dose Informant Patient Reported? Taking?    acetaminophen (TYLENOL) 500 MG tablet  Self Yes No    Take 500 mg by mouth 2 (Two) Times a Day As Needed for Mild Pain , Moderate Pain , Headache or Fever.    Crisaborole 2 % ointment   No No    Apply 1 application topically 2 (two) times a day.    omeprazole (priLOSEC) 20 MG capsule  Self Yes No    Take 20 mg by mouth Daily As Needed.            Allergies:  No Known Allergies    Objective      Vitals:   Temp:  [97.4 °F (36.3 °C)] 97.4 °F (36.3 °C)  Heart Rate:  [50-67] 50  Resp:  [12-16] 12  BP: ()/(67-79) 92/67    Physical Exam  Vitals and nursing note reviewed.   Constitutional:       General: He is not in acute distress.     Appearance: He is normal weight. He is not toxic-appearing.   HENT:      Head: Normocephalic and atraumatic.      Nose: Nose normal.      Mouth/Throat:      Mouth: Mucous membranes are dry.      Pharynx: Oropharynx is clear.   Eyes:      General: No scleral icterus.     Extraocular Movements: Extraocular movements intact.      Conjunctiva/sclera: Conjunctivae normal.      Pupils: Pupils are equal, round, and reactive to light.   Cardiovascular:      Rate and Rhythm: Normal rate and regular rhythm.      Heart  sounds: Normal heart sounds. No murmur heard.  Pulmonary:      Effort: Pulmonary effort is normal.      Breath sounds: Normal breath sounds. No stridor. No wheezing or rales.   Abdominal:      General: Bowel sounds are decreased. There is distension.      Tenderness: There is abdominal tenderness in the right upper quadrant and left upper quadrant.   Musculoskeletal:         General: No swelling, tenderness or signs of injury. Normal range of motion.      Cervical back: Normal range of motion and neck supple.   Lymphadenopathy:      Cervical: No cervical adenopathy.   Skin:     General: Skin is warm and dry.      Capillary Refill: Capillary refill takes less than 2 seconds.      Coloration: Skin is not jaundiced.      Findings: No erythema, lesion or rash.      Comments: Tattoos noted on bilateral upper arms and left hand by the thumb   Neurological:      General: No focal deficit present.      Mental Status: He is alert and oriented to person, place, and time. Mental status is at baseline.      Motor: No weakness.   Psychiatric:         Mood and Affect: Mood normal.         Behavior: Behavior normal.         Thought Content: Thought content normal.         Judgment: Judgment normal.         Result Review    Result Review:  I have personally reviewed the results from the time of this admission to 6/19/2022 10:36 EDT and agree with these findings:  [x]  Laboratory  [x]  Microbiology  [x]  Radiology  [x]  EKG/Telemetry   []  Cardiology/Vascular   []  Pathology  [x]  Old records  []  Other:  Most notable findings include:    WBC 14.40, 24% eosinophilia (absolute Eosin. 3.5K)      Assessment & Plan        Active Hospital Problems:  Active Hospital Problems    Diagnosis    • Abdominal pain, unspecified abdominal location    • SBO (small bowel obstruction) (HCC)    • GERD (gastroesophageal reflux disease)    • Eosinophilia      Plan:   Abdomoinal pain  Acute small bowel obstruction  -- persistent and recurrent, with  progression over past few weeks  -- Abdominal XR: mild dilated loops of small bowel, fluid levels at LUQ     --CT abdo/pelvis: acute small bowel obstruction  -- GI consulted from ED  -- Consult General surgery - D/W Dr. Osborn briefly  -- NPO  -- NG tube placement if increased distention, or nausea/vomiting occurs  -- IV Fluids, IV pain meds, antiemetics, etc.  -- continue to monitor CMP, CBC    Eosinophilia  -- significant elevation with absolute eosinophils 3500     -- 6/8/22 eosinophils 1500 in ED  -- possibly eosinophilic gastritis or gastrointestinal disease (EGID)     -- differential:  myeloproliferation/malignancy, infection (parasites, fungus), Inflammatory bowel disease, polyarteritis nodosa, Churg-Fabian     -- check B12, tryptase, iron panel, ESR, CRP,      -- check serum immunoglobulins (IgE. IgG4, IgA)     -- Check GI PCR panel, Cryptosporidium, Rotavirus, lactoferrin     -- check PRICE, ANCA, Cryoglobulin, C3-C4, Serum Flow cytometry     -- stool studies as noted above  -- may need to consider endoscopy for mucosal biopsies, will d/w GI     -- could be done as outpatient perhaps    GERD  --Omeprazole being given as pantoprazole  --continue IV while NPO, then may switch to PO later      DVT prophylaxis:  No DVT prophylaxis order currently exists.    CODE STATUS:       Admission Status:  I believe this patient meets observation status.    I discussed the patient's findings and my recommendations with patient and nursing staff.    This patient has been examined wearing appropriate Personal Protective Equipment. 06/19/22      Signature: Electronically signed by Payal Parikh MD, 06/19/22, 10:36 AM EDT.

## 2022-06-19 NOTE — ED PROVIDER NOTES
Subjective   Chief complaint: Patient is a pleasant 41-year-old.  He was here few weeks ago.  He had upper abdominal pain.  It was improving.  Yesterday started cramping again.  And then he developed sharp pain in his upper abdomen.  Feels in his back.  It radiates all over however.  He had a CT scan done that showed no acute process of couple of weeks ago.  He has seen GI in the past however he states it has not been this severe before.  Nauseated but no vomiting.  No diarrhea.  No fever.  No chest pain.  No blood in his stool.  No pain when he urinates.    Context: As above    Duration: Over the last    Timing: Gradual worsening    Severity: Severe    Associated Symptoms: As noted above        PCP:  LMP:          Review of Systems   Constitutional: Negative for fever.   HENT: Negative.    Respiratory: Negative.    Cardiovascular: Negative for chest pain.   Gastrointestinal: Positive for abdominal pain.   Genitourinary: Negative.    Musculoskeletal: Positive for back pain.   Skin: Negative.        Past Medical History:   Diagnosis Date   • GERD (gastroesophageal reflux disease)        No Known Allergies    Past Surgical History:   Procedure Laterality Date   • ABDOMINAL SURGERY     • COLONOSCOPY         Family History   Problem Relation Age of Onset   • No Known Problems Mother        Social History     Socioeconomic History   • Marital status:    Tobacco Use   • Smoking status: Former Smoker     Packs/day: 2.00     Years: 21.00     Pack years: 42.00     Types: Cigarettes     Start date: 1998     Quit date: 2019     Years since quitting: 3.4   • Smokeless tobacco: Never Used   Substance and Sexual Activity   • Alcohol use: No   • Drug use: No   • Sexual activity: Yes     Partners: Female           Objective   Physical Exam  Vitals and nursing note reviewed.   Constitutional:       General: He is in acute distress.      Appearance: He is well-developed.   HENT:      Head: Normocephalic and atraumatic.    Cardiovascular:      Rate and Rhythm: Normal rate and regular rhythm.   Pulmonary:      Effort: Pulmonary effort is normal.      Breath sounds: Normal breath sounds.   Abdominal:      General: Abdomen is flat. Bowel sounds are normal.      Tenderness: There is generalized abdominal tenderness. There is guarding. There is no rebound.   Skin:     General: Skin is warm and dry.   Neurological:      General: No focal deficit present.      Mental Status: He is alert and oriented to person, place, and time.   Psychiatric:         Mood and Affect: Mood normal.         Behavior: Behavior normal.         Procedures           ED Course      Results for orders placed or performed during the hospital encounter of 06/19/22   Urinalysis With Culture If Indicated - Urine, Clean Catch    Specimen: Urine, Clean Catch   Result Value Ref Range    Color, UA Yellow Yellow, Straw    Appearance, UA Cloudy (A) Clear    pH, UA 8.0 5.0 - 8.0    Specific Gravity, UA 1.020 1.005 - 1.030    Glucose, UA Negative Negative    Ketones, UA Negative Negative    Bilirubin, UA Negative Negative    Blood, UA Negative Negative    Protein, UA Negative Negative    Leuk Esterase, UA Negative Negative    Nitrite, UA Negative Negative    Urobilinogen, UA 1.0 E.U./dL 0.2 - 1.0 E.U./dL   Comprehensive Metabolic Panel    Specimen: Arm, Left; Blood   Result Value Ref Range    Glucose 98 65 - 99 mg/dL    BUN 14 6 - 20 mg/dL    Creatinine 0.87 0.76 - 1.27 mg/dL    Sodium 137 136 - 145 mmol/L    Potassium 3.9 3.5 - 5.2 mmol/L    Chloride 104 98 - 107 mmol/L    CO2 21.0 (L) 22.0 - 29.0 mmol/L    Calcium 8.7 8.6 - 10.5 mg/dL    Total Protein 6.8 6.0 - 8.5 g/dL    Albumin 4.30 3.50 - 5.20 g/dL    ALT (SGPT) 15 1 - 41 U/L    AST (SGOT) 16 1 - 40 U/L    Alkaline Phosphatase 59 39 - 117 U/L    Total Bilirubin 0.5 0.0 - 1.2 mg/dL    Globulin 2.5 gm/dL    A/G Ratio 1.7 g/dL    BUN/Creatinine Ratio 16.1 7.0 - 25.0    Anion Gap 12.0 5.0 - 15.0 mmol/L    eGFR 111.9 >60.0  mL/min/1.73   Lipase    Specimen: Blood   Result Value Ref Range    Lipase 33 13 - 60 U/L   CBC Auto Differential    Specimen: Blood   Result Value Ref Range    WBC 14.40 (H) 3.40 - 10.80 10*3/mm3    RBC 4.74 4.14 - 5.80 10*6/mm3    Hemoglobin 13.6 13.0 - 17.7 g/dL    Hematocrit 39.0 37.5 - 51.0 %    MCV 82.2 79.0 - 97.0 fL    MCH 28.8 26.6 - 33.0 pg    MCHC 35.0 31.5 - 35.7 g/dL    RDW 13.9 12.3 - 15.4 %    RDW-SD 40.3 37.0 - 54.0 fl    MPV 7.7 6.0 - 12.0 fL    Platelets 288 140 - 450 10*3/mm3    Neutrophil % 52.8 42.7 - 76.0 %    Lymphocyte % 16.0 (L) 19.6 - 45.3 %    Monocyte % 6.4 5.0 - 12.0 %    Eosinophil % 24.1 (H) 0.3 - 6.2 %    Basophil % 0.7 0.0 - 1.5 %    Neutrophils, Absolute 7.60 (H) 1.70 - 7.00 10*3/mm3    Lymphocytes, Absolute 2.30 0.70 - 3.10 10*3/mm3    Monocytes, Absolute 0.90 0.10 - 0.90 10*3/mm3    Eosinophils, Absolute 3.50 (H) 0.00 - 0.40 10*3/mm3    Basophils, Absolute 0.10 0.00 - 0.20 10*3/mm3    nRBC 0.0 0.0 - 0.2 /100 WBC   Troponin    Specimen: Arm, Left; Blood   Result Value Ref Range    Troponin T <0.010 0.000 - 0.030 ng/mL   ECG 12 Lead   Result Value Ref Range    QT Interval 449 ms     XR Abdomen 2+ VW with Chest 1 VW    Result Date: 6/19/2022  1.  Mildly dilated loops of small bowel at the left upper abdomen with fluid levels. Suspicious for early obstruction versus ileus. 2.  Other findings as above. Electronically signed by:  Kwabena Gomez D.O.  6/19/2022 5:27 AM                                               MDM  Number of Diagnoses or Management Options  Abdominal pain, unspecified abdominal location  Diagnosis management comments: Patient's x-ray does have air-fluid levels.  He had negative CT on the eighth.  At this point time we will admit him for surgical and GI consultation.  With his elevated white count I did start antibiotics.  Is not actively vomiting right now.  He had no intra-abdominal surgeries in the past.  Can evaluate gallbladder in further detail as well as  potential bowel obstruction.  Discussed with patient.  He verbalized understanding is okay with this.  Discussed with Dr. Parikh who agrees to admit.  Patient does have frequent PVCs on his EKG.  Will place on telemetry for that reason.       Amount and/or Complexity of Data Reviewed  Clinical lab tests: reviewed  Tests in the radiology section of CPT®: reviewed  Tests in the medicine section of CPT®: reviewed  Independent visualization of images, tracings, or specimens: yes    Patient Progress  Patient progress: stable      Final diagnoses:   None   Abdominal pain    ED Disposition  ED Disposition     None          No follow-up provider specified.       Medication List      No changes were made to your prescriptions during this visit.          Jaskaran Bianchi,   06/19/22 0814       Jaskaran Bianchi,   06/19/22 0815

## 2022-06-19 NOTE — CONSULTS
General Surgery Consult Note      Name: Gerardo Hyatt ADMIT: 2022   : 1980  PCP: Kisha Linares APRN    MRN: [id number] LOS: 0 days   AGE/SEX: 41 y.o. male  ROOM: [address] [place] [address]      Patient Care Team:  Kisha Linares APRN as PCP - General (Nurse Practitioner)  Chief Complaint   Patient presents with   • Abdominal Pain       Subjective   41-year-old gentleman with a several week history of epigastric abdominal pain which has been progressive.  He has had a few very severe flareups which is felt like burning sometimes stabbing.  Never been associated with nausea or vomiting he has not had any significant changes bowel function.  Radiates across to his abdomen.  He was hospitalized about 7 years ago for similar symptoms and said that he had some work-up with gastroenterology but ultimately symptoms resolved spontaneously.  He has not had any significant flareups since that time.  He had a CT scan about 10 days ago which showed no acute processes and a new CT scan yesterday showing small bowel obstruction.  There is no known intra-abdominal surgical history.    Past Medical History:   Diagnosis Date   • GERD (gastroesophageal reflux disease)      Past Surgical History:   Procedure Laterality Date   • ABDOMINAL SURGERY     • COLONOSCOPY       Family History   Problem Relation Age of Onset   • No Known Problems Mother      Social History     Tobacco Use   • Smoking status: Former Smoker     Packs/day: 2.00     Years: 21.00     Pack years: 42.00     Types: Cigarettes     Start date:      Quit date: 2019     Years since quitting: 3.4   • Smokeless tobacco: Never Used   Substance Use Topics   • Alcohol use: No   • Drug use: No     Medications Prior to Admission   Medication Sig Dispense Refill Last Dose   • acetaminophen (TYLENOL) 500 MG tablet Take 500 mg by mouth 2 (Two) Times a Day As Needed for Mild Pain , Moderate Pain , Headache or Fever.      • omeprazole (priLOSEC) 20 MG capsule  "Take 20 mg by mouth Daily As Needed.        [START ON 6/20/2022] pantoprazole, 40 mg, Intravenous, Q AM  senna-docusate sodium, 2 tablet, Oral, BID      sodium chloride, 125 mL/hr, Last Rate: 125 mL/hr (06/19/22 1151)      •  senna-docusate sodium **AND** polyethylene glycol **AND** bisacodyl **AND** bisacodyl  •  ketorolac  •  Morphine **AND** naloxone  •  ondansetron **OR** ondansetron  •  [COMPLETED] Insert peripheral IV **AND** sodium chloride  Patient has no known allergies.    Review of Systems   Constitutional: Negative for chills and fever.   HENT: Negative for sore throat and trouble swallowing.    Eyes: Negative for blurred vision and double vision.   Respiratory: Negative for cough and shortness of breath.    Cardiovascular: Negative for chest pain and leg swelling.   Gastrointestinal: Positive for abdominal pain. Negative for abdominal distention, constipation, diarrhea, nausea and vomiting.   Genitourinary: Negative for dysuria and hematuria.   Skin: Negative for rash and wound.   Neurological: Negative for dizziness and confusion.   Psychiatric/Behavioral: Negative for agitation and depressed mood.        Objective     Vital Signs and Labs:  Vital Signs Patient Vitals for the past 24 hrs:   BP Temp Temp src Pulse Resp SpO2 Height Weight   06/19/22 1245 99/63 97.7 °F (36.5 °C) Oral -- 15 99 % -- --   06/19/22 1101 94/66 -- -- 55 14 97 % -- --   06/19/22 1001 99/66 -- -- 53 16 99 % -- --   06/19/22 0801 92/67 -- -- 50 12 95 % -- --   06/19/22 0701 109/67 -- -- 54 15 93 % -- --   06/19/22 0603 114/76 -- -- 66 16 100 % -- --   06/19/22 0508 124/79 97.4 °F (36.3 °C) Oral 67 15 100 % 172.7 cm (68\") 74.4 kg (164 lb 0.4 oz)       Physical Exam:  Physical Exam  Constitutional:       Appearance: Normal appearance. He is well-developed.   HENT:      Head: Normocephalic and atraumatic.   Eyes:      General: No scleral icterus.     Conjunctiva/sclera: Conjunctivae normal.   Neck:      Trachea: No tracheal " deviation.   Cardiovascular:      Rate and Rhythm: Normal rate.      Pulses: Normal pulses.   Pulmonary:      Effort: Pulmonary effort is normal. No respiratory distress.   Abdominal:      General: There is no distension.      Palpations: Abdomen is soft.      Comments: Moderate tenderness to palpation in the epigastric   Musculoskeletal:         General: No swelling or tenderness.      Cervical back: Neck supple. No tenderness. No muscular tenderness.   Skin:     General: Skin is warm and dry.   Neurological:      General: No focal deficit present.      Mental Status: He is alert. Mental status is at baseline.   Psychiatric:         Mood and Affect: Mood normal.         Behavior: Behavior normal.         CBC    Results from last 7 days   Lab Units 06/19/22  0622   WBC 10*3/mm3 14.40*   HEMOGLOBIN g/dL 13.6   PLATELETS 10*3/mm3 288     BMP   Results from last 7 days   Lab Units 06/19/22  0647   SODIUM mmol/L 137   POTASSIUM mmol/L 3.9   CHLORIDE mmol/L 104   CO2 mmol/L 21.0*   BUN mg/dL 14   CREATININE mg/dL 0.87   GLUCOSE mg/dL 98   I reviewed the CT images and read suggesting a bowel obstruction    I reviewed the patient's new clinical results.  I reviewed the patient's new imaging results and agree with the interpretation.    Assessment & Plan       Abdominal pain, unspecified abdominal location    GERD (gastroesophageal reflux disease)    Eosinophilia      41 y.o. male with a history of intermittent abdominal pain who reports a flareup of abdominal pain.  Imaging suggesting bowel obstruction.    Notified by medicine about his arrival.  I will think there is any urgency to take him to surgery today.  Gastroenterology I believe is best to see him so we will see what they think about his presentation and these recurrent bouts of abdominal pain.  Clinically continues to have bowel function and does not have any nausea or vomiting.  Will see what happens over the next day or 2 and consider small bowel follow-through  versus surgical intervention.    Since he is not having nausea or vomiting I think we can hold off on nasogastric tube decompression.  Okay for sips of liquids and ice chips for comfort      This note was created using Dragon Voice Recognition software.    Rodrigo Osborn MD  06/19/22  15:44 EDT

## 2022-06-19 NOTE — CONSULTS
GI CONSULT  NOTE:    Referring Provider:  Dr. Parikh    Chief complaint: SBO    Subjective .     History of present illness: Gerardo Hyatt is a 41 y.o. male with history of SBO (2015) and GERD who presents with complaint of crampy upper abdominal pain.  He had similar complaints on 6/8/2022 but work-up in the ER was unremarkable so he was advised to follow-up with GI as outpatient. He reports sharp abdominal pain that began yesterday and waxes/wanes. Nausea but no vomiting. Tolerating ice chips. Had a small BM yesterday and passing gas today. Generally he has normal BMs, no constipation or diarrhea. He is tender on left side of abdomen and distended. No past surgeries or hernias. He has history of tobacco abuse, quit 2005.  No history of alcohol or illicit drug abuse.    Labs -low iron, CMP WNL, WBC 14.4, CRP WNL  6/19/2022 CT AP W/ -acute SBO, transition point in left abdomen, normal colon      Endo History:  3/2015 colonoscopy (Dr. Chester) -normal exams, no evidence of Crohn's disease    Past Medical History:  Past Medical History:   Diagnosis Date   • GERD (gastroesophageal reflux disease)        Past Surgical History:  Past Surgical History:   Procedure Laterality Date   • ABDOMINAL SURGERY     • COLONOSCOPY         Social History:  Social History     Tobacco Use   • Smoking status: Former Smoker     Packs/day: 2.00     Years: 21.00     Pack years: 42.00     Types: Cigarettes     Start date: 1998     Quit date: 2019     Years since quitting: 3.4   • Smokeless tobacco: Never Used   Substance Use Topics   • Alcohol use: No   • Drug use: No       Family History:  Family History   Problem Relation Age of Onset   • No Known Problems Mother        Medications:  Medications Prior to Admission   Medication Sig Dispense Refill Last Dose   • acetaminophen (TYLENOL) 500 MG tablet Take 500 mg by mouth 2 (Two) Times a Day As Needed for Mild Pain , Moderate Pain , Headache or Fever.      • omeprazole (priLOSEC) 20 MG  capsule Take 20 mg by mouth Daily As Needed.          Scheduled Meds:[START ON 6/20/2022] pantoprazole, 40 mg, Intravenous, Q AM  senna-docusate sodium, 2 tablet, Oral, BID      Continuous Infusions:sodium chloride, 125 mL/hr, Last Rate: 125 mL/hr (06/19/22 1151)      PRN Meds:.•  senna-docusate sodium **AND** polyethylene glycol **AND** bisacodyl **AND** bisacodyl  •  ketorolac  •  Morphine **AND** naloxone  •  ondansetron **OR** ondansetron  •  [COMPLETED] Insert peripheral IV **AND** sodium chloride    ALLERGIES:  Patient has no known allergies.    Review of Systems:  Review of Systems   Constitutional: Negative.    HENT: Negative.    Respiratory: Negative.    Cardiovascular: Negative.    Gastrointestinal: Positive for abdominal distention, abdominal pain and nausea. Negative for blood in stool, constipation, diarrhea and vomiting.   Genitourinary: Negative.    Musculoskeletal: Negative.    Skin: Negative.    Neurological: Negative.    Psychiatric/Behavioral: Negative.          Objective     Vital Signs:   Vitals:    06/19/22 0801 06/19/22 1001 06/19/22 1101 06/19/22 1245   BP: 92/67 99/66 94/66 99/63   BP Location:    Left arm   Patient Position:       Pulse: 50 53 55    Resp: 12 16 14 15   Temp:    97.7 °F (36.5 °C)   TempSrc:    Oral   SpO2: 95% 99% 97% 99%   Weight:       Height:           Physical Exam: resting in bed    General Appearance:    Awake and alert, in no acute distress   Head:    Normocephalic, without obvious abnormality   Throat:   No oral lesions, no thrush, oral mucosa moist   Lungs:     Respirations regular, even and unlabored   Chest Wall:    No abnormalities observed   Abdomen:     Distended, tender on left side   Rectal:     Deferred   Extremities:   Moves all extremities, no edema, no cyanosis   Pulses:   Pulses palpable and equal bilaterally   Skin:   No bruising, no rash, no jaundice, normal palpation   Lymph nodes:   No cervical, supraclavicular or submandibular palpable adenopathy    Neurologic:   No asterixis       Results Review:  I reviewed the patient's labs and imaging.     CBC  Results from last 7 days   Lab Units 06/19/22  0622   RBC 10*6/mm3 4.74   WBC 10*3/mm3 14.40*   HEMOGLOBIN g/dL 13.6   PLATELETS 10*3/mm3 288       CMP  Results from last 7 days   Lab Units 06/19/22  0647   SODIUM mmol/L 137   POTASSIUM mmol/L 3.9   CHLORIDE mmol/L 104   CO2 mmol/L 21.0*   BUN mg/dL 14   CREATININE mg/dL 0.87   GLUCOSE mg/dL 98   ALBUMIN g/dL 4.30   BILIRUBIN mg/dL 0.5   ALK PHOS U/L 59   AST (SGOT) U/L 16   ALT (SGPT) U/L 15       Amylase and Lipase  Results from last 7 days   Lab Units 06/19/22  0550   LIPASE U/L 33       CRP     Results from last 7 days   Lab Units 06/19/22  1112   CRP mg/dL 0.40       Imaging Results (Last 24 Hours)     Procedure Component Value Units Date/Time    CT Abdomen Pelvis With Contrast [[id number]] Collected: 06/19/22 1040     Updated: 06/19/22 1050    Narrative:         DATE OF EXAM:  6/19/2022 10:09 AM     PROCEDURE:  CT ABDOMEN PELVIS W CONTRAST-     INDICATIONS:   Bowel obstruction suspected; R10.9-Unspecified abdominal pain     COMPARISON:   06/08/2022     TECHNIQUE:  Routine transaxial slices were obtained through the abdomen and pelvis  after the intravenous administration of Isovue 370. Reconstructed  coronal and sagittal images were also obtained. Automated exposure  control and iterative construction methods were used.     FINDINGS:  The lung bases are clear.     The liver, gallbladder, adrenal glands, kidneys, and spleen are  unremarkable. A small lipoma within the pancreatic body appears  unchanged.     The stomach appears normal. There are several dilated loops of small  bowel with transition to decompressed small bowel within the left  abdomen, suggesting acute small bowel obstruction. The colon appears  normal. The appendix appears normal. There is no loculated collection.  No abnormally enlarged lymph nodes are identified.     The rectum, prostate, and  urinary bladder are unremarkable. A small  amount of pelvic free fluid is present.     No aggressive osseous lesions are identified.        Impression:      1.Several dilated loops of small bowel with transition to decompressed  small bowel in left abdomen, suggesting acute small bowel obstruction.  2.Small amount of pelvic free fluid.     Electronically Signed By-Denzel Her MD On:6/19/2022 10:48 AM  This report was finalized on [id number] by  Denzel Her MD.    XR Abdomen 2+ VW with Chest 1 VW [[id number]] Collected: 06/19/22 0724     Updated: 06/19/22 0728    Narrative:      Abdomen radiograph, 2 views  Frontal chest radiograph    Indication: Abdominal pain.    Comparison: CT abdomen/pelvis dated 6/8/2022.    Findings:    No  focal consolidation, pleural effusion, or pneumothorax. The cardiomediastinal silhouette is within normal limits.     No free air identified.  Mildly dilated loops of small bowel in the left upper abdomen with air-fluid level. Mild to moderate formed stool noted.    No abnormal abdominal calcification    No acute osseous abnormality identified.        Impression:        1.  Mildly dilated loops of small bowel at the left upper abdomen with fluid levels. Suspicious for early obstruction versus ileus.  2.  Other findings as above.        Electronically signed by:  Kwabena Gomez D.O.    6/19/2022 5:27 AM            ASSESSMENT:  41 y.o. male with history of SBO (2015) and GERD who presents with complaint of crampy upper abdominal pain.  CT consistent with acute small bowel obstruction.  -Small bowel obstruction - consider hernia vs. IBD although he does not have typical symptoms of IBD most of the time and past colonoscopy was normal.   -Abdominal pain - r/t above  -History of small bowel obstruction -s/p normal colonoscopy 2015  -GERD  -Leukocytosis  -Low iron -with normal hemoglobin       PLAN:  Keep NPO except ice chips. Continue IVF.  Continue daily PPI.  Antiemetics as  needed.  General surgery is consulted. Await input.  Consider repeat outpatient colonoscopy.     We appreciate the referral.    Felipa Hurtado, JASEN  06/19/22  14:26 EDT

## 2022-06-20 ENCOUNTER — ANESTHESIA EVENT (OUTPATIENT)
Dept: GASTROENTEROLOGY | Facility: HOSPITAL | Age: 42
End: 2022-06-20

## 2022-06-20 ENCOUNTER — ANESTHESIA (OUTPATIENT)
Dept: GASTROENTEROLOGY | Facility: HOSPITAL | Age: 42
End: 2022-06-20

## 2022-06-20 ENCOUNTER — ON CAMPUS - OUTPATIENT (OUTPATIENT)
Dept: URBAN - METROPOLITAN AREA HOSPITAL 85 | Facility: HOSPITAL | Age: 42
End: 2022-06-20

## 2022-06-20 VITALS — DIASTOLIC BLOOD PRESSURE: 36 MMHG | SYSTOLIC BLOOD PRESSURE: 73 MMHG | HEART RATE: 67 BPM | OXYGEN SATURATION: 100 %

## 2022-06-20 DIAGNOSIS — D72.10 EOSINOPHILIA, UNSPECIFIED: ICD-10-CM

## 2022-06-20 DIAGNOSIS — R10.9 UNSPECIFIED ABDOMINAL PAIN: ICD-10-CM

## 2022-06-20 DIAGNOSIS — K56.609 UNSPECIFIED INTESTINAL OBSTRUCTION, UNSPECIFIED AS TO PARTIA: ICD-10-CM

## 2022-06-20 LAB
ALBUMIN SERPL-MCNC: 3.6 G/DL (ref 3.5–5.2)
ALBUMIN/GLOB SERPL: 1.6 G/DL
ALP SERPL-CCNC: 56 U/L (ref 39–117)
ALT SERPL W P-5'-P-CCNC: 12 U/L (ref 1–41)
ANION GAP SERPL CALCULATED.3IONS-SCNC: 11 MMOL/L (ref 5–15)
AST SERPL-CCNC: 15 U/L (ref 1–40)
BASOPHILS # BLD AUTO: 0.1 10*3/MM3 (ref 0–0.2)
BASOPHILS NFR BLD AUTO: 0.5 % (ref 0–1.5)
BILIRUB SERPL-MCNC: 0.7 MG/DL (ref 0–1.2)
BUN SERPL-MCNC: 15 MG/DL (ref 6–20)
BUN/CREAT SERPL: 17.4 (ref 7–25)
CALCIUM SPEC-SCNC: 8.3 MG/DL (ref 8.6–10.5)
CHLORIDE SERPL-SCNC: 105 MMOL/L (ref 98–107)
CO2 SERPL-SCNC: 22 MMOL/L (ref 22–29)
CREAT SERPL-MCNC: 0.86 MG/DL (ref 0.76–1.27)
DEPRECATED RDW RBC AUTO: 41.1 FL (ref 37–54)
EGFRCR SERPLBLD CKD-EPI 2021: 112.3 ML/MIN/1.73
EOSINOPHIL # BLD AUTO: 3.3 10*3/MM3 (ref 0–0.4)
EOSINOPHIL NFR BLD AUTO: 20.5 % (ref 0.3–6.2)
ERYTHROCYTE [DISTWIDTH] IN BLOOD BY AUTOMATED COUNT: 13.9 % (ref 12.3–15.4)
GLOBULIN UR ELPH-MCNC: 2.3 GM/DL
GLUCOSE SERPL-MCNC: 73 MG/DL (ref 65–99)
HCT VFR BLD AUTO: 40.6 % (ref 37.5–51)
HGB BLD-MCNC: 14 G/DL (ref 13–17.7)
LYMPHOCYTES # BLD AUTO: 2 10*3/MM3 (ref 0.7–3.1)
LYMPHOCYTES NFR BLD AUTO: 12.4 % (ref 19.6–45.3)
MCH RBC QN AUTO: 28.8 PG (ref 26.6–33)
MCHC RBC AUTO-ENTMCNC: 34.6 G/DL (ref 31.5–35.7)
MCV RBC AUTO: 83.4 FL (ref 79–97)
MONOCYTES # BLD AUTO: 1 10*3/MM3 (ref 0.1–0.9)
MONOCYTES NFR BLD AUTO: 6.5 % (ref 5–12)
NEUTROPHILS NFR BLD AUTO: 60.1 % (ref 42.7–76)
NEUTROPHILS NFR BLD AUTO: 9.6 10*3/MM3 (ref 1.7–7)
NRBC BLD AUTO-RTO: 0 /100 WBC (ref 0–0.2)
PLATELET # BLD AUTO: 259 10*3/MM3 (ref 140–450)
PMV BLD AUTO: 7.8 FL (ref 6–12)
POTASSIUM SERPL-SCNC: 4.3 MMOL/L (ref 3.5–5.2)
PROT SERPL-MCNC: 5.9 G/DL (ref 6–8.5)
QT INTERVAL: 449 MS
RBC # BLD AUTO: 4.87 10*6/MM3 (ref 4.14–5.8)
SODIUM SERPL-SCNC: 138 MMOL/L (ref 136–145)
WBC NRBC COR # BLD: 15.9 10*3/MM3 (ref 3.4–10.8)

## 2022-06-20 PROCEDURE — 99213 OFFICE O/P EST LOW 20 MIN: CPT | Performed by: SURGERY

## 2022-06-20 PROCEDURE — 25010000002 PROPOFOL 10 MG/ML EMULSION: Performed by: ANESTHESIOLOGY

## 2022-06-20 PROCEDURE — 96376 TX/PRO/DX INJ SAME DRUG ADON: CPT

## 2022-06-20 PROCEDURE — 44360 SMALL BOWEL ENDOSCOPY: CPT | Performed by: INTERNAL MEDICINE

## 2022-06-20 PROCEDURE — 25010000002 KETOROLAC TROMETHAMINE PER 15 MG: Performed by: INTERNAL MEDICINE

## 2022-06-20 PROCEDURE — 83630 LACTOFERRIN FECAL (QUAL): CPT | Performed by: INTERNAL MEDICINE

## 2022-06-20 PROCEDURE — G0378 HOSPITAL OBSERVATION PER HR: HCPCS

## 2022-06-20 PROCEDURE — 88305 TISSUE EXAM BY PATHOLOGIST: CPT | Performed by: INTERNAL MEDICINE

## 2022-06-20 PROCEDURE — 80053 COMPREHEN METABOLIC PANEL: CPT | Performed by: INTERNAL MEDICINE

## 2022-06-20 PROCEDURE — 86160 COMPLEMENT ANTIGEN: CPT | Performed by: INTERNAL MEDICINE

## 2022-06-20 PROCEDURE — 85025 COMPLETE CBC W/AUTO DIFF WBC: CPT | Performed by: INTERNAL MEDICINE

## 2022-06-20 PROCEDURE — 86008 ALLG SPEC IGE RECOMB EA: CPT | Performed by: INTERNAL MEDICINE

## 2022-06-20 PROCEDURE — 99225 PR SBSQ OBSERVATION CARE/DAY 25 MINUTES: CPT | Performed by: INTERNAL MEDICINE

## 2022-06-20 PROCEDURE — 87507 IADNA-DNA/RNA PROBE TQ 12-25: CPT | Performed by: INTERNAL MEDICINE

## 2022-06-20 RX ORDER — PHENYLEPHRINE HCL IN 0.9% NACL 1 MG/10 ML
SYRINGE (ML) INTRAVENOUS AS NEEDED
Status: DISCONTINUED | OUTPATIENT
Start: 2022-06-20 | End: 2022-06-20 | Stop reason: SURG

## 2022-06-20 RX ORDER — LIDOCAINE HYDROCHLORIDE 10 MG/ML
INJECTION, SOLUTION EPIDURAL; INFILTRATION; INTRACAUDAL; PERINEURAL AS NEEDED
Status: DISCONTINUED | OUTPATIENT
Start: 2022-06-20 | End: 2022-06-20 | Stop reason: SURG

## 2022-06-20 RX ORDER — ONDANSETRON 2 MG/ML
4 INJECTION INTRAMUSCULAR; INTRAVENOUS EVERY 6 HOURS PRN
Status: DISCONTINUED | OUTPATIENT
Start: 2022-06-20 | End: 2022-06-20 | Stop reason: SDUPTHER

## 2022-06-20 RX ORDER — ONDANSETRON 4 MG/1
4 TABLET, FILM COATED ORAL EVERY 6 HOURS PRN
Status: DISCONTINUED | OUTPATIENT
Start: 2022-06-20 | End: 2022-06-20 | Stop reason: SDUPTHER

## 2022-06-20 RX ORDER — PROPOFOL 10 MG/ML
VIAL (ML) INTRAVENOUS AS NEEDED
Status: DISCONTINUED | OUTPATIENT
Start: 2022-06-20 | End: 2022-06-20 | Stop reason: SURG

## 2022-06-20 RX ADMIN — Medication 100 MCG: at 14:05

## 2022-06-20 RX ADMIN — PANTOPRAZOLE SODIUM 40 MG: 40 INJECTION, POWDER, FOR SOLUTION INTRAVENOUS at 06:09

## 2022-06-20 RX ADMIN — KETOROLAC TROMETHAMINE 30 MG: 30 INJECTION, SOLUTION INTRAMUSCULAR at 03:52

## 2022-06-20 RX ADMIN — PROPOFOL 30 MG: 10 INJECTION, EMULSION INTRAVENOUS at 13:51

## 2022-06-20 RX ADMIN — PROPOFOL 30 MG: 10 INJECTION, EMULSION INTRAVENOUS at 13:49

## 2022-06-20 RX ADMIN — SENNOSIDES AND DOCUSATE SODIUM 2 TABLET: 50; 8.6 TABLET ORAL at 20:01

## 2022-06-20 RX ADMIN — PROPOFOL 100 MG: 10 INJECTION, EMULSION INTRAVENOUS at 13:47

## 2022-06-20 RX ADMIN — SODIUM CHLORIDE 125 ML/HR: 9 INJECTION, SOLUTION INTRAVENOUS at 12:05

## 2022-06-20 RX ADMIN — LIDOCAINE HYDROCHLORIDE 50 MG: 10 INJECTION, SOLUTION EPIDURAL; INFILTRATION; INTRACAUDAL; PERINEURAL at 13:47

## 2022-06-20 NOTE — ANESTHESIA PREPROCEDURE EVALUATION
Anesthesia Evaluation     Patient summary reviewed and Nursing notes reviewed   no history of anesthetic complications:  NPO Solid Status: > 8 hours  NPO Liquid Status: > 8 hours           Airway   Mallampati: II  TM distance: >3 FB  Neck ROM: full  No difficulty expected  Dental      Pulmonary - negative pulmonary ROS    breath sounds clear to auscultation  Cardiovascular - negative cardio ROS    ECG reviewed  Rhythm: regular  Rate: normal        Neuro/Psych- negative ROS  GI/Hepatic/Renal/Endo    (+)  GERD,      Musculoskeletal (-) negative ROS    Abdominal     Abdomen: soft.   Substance History - negative use     OB/GYN negative ob/gyn ROS         Other - negative ROS                       Anesthesia Plan    ASA 2     MAC     (SBO resolved per Dr. Robertson)  intravenous induction     Anesthetic plan, risks, benefits, and alternatives have been provided, discussed and informed consent has been obtained with: patient.    Plan discussed with CAA.        CODE STATUS:    Code Status (Patient has no pulse and is not breathing): CPR (Attempt to Resuscitate)  Medical Interventions (Patient has pulse or is breathing): Full Support

## 2022-06-20 NOTE — CASE MANAGEMENT/SOCIAL WORK
Continued Stay Note  HILARIA Garza     Patient Name: Gerardo Hyatt  MRN: [id number]  Today's Date: 6/20/2022    Admit Date: 6/19/2022     Discharge Plan     Row Name 06/20/22 1318       Plan    Plan Needs cm assessment-in endo            Phone communication or documentation only - no physical contact with patient or family.        Jenn Chopra RN

## 2022-06-20 NOTE — PROGRESS NOTES
[facility] Medicine Services Daily Progress Note    Patient Name: Gerardo Hyatt  : 1980  MRN: [id number]  Primary Care Physician:  Kisha Linares APRN  Date of admission: 2022      Subjective      Chief Complaint: Abdominal pain    Patient seen and examined this morning.  States she feels significantly better compared to yesterday.  Abdominal pain is well controlled.  No nausea, vomiting, or diarrhea.  No bowel movement for past couple days.  States GI is planning for EGD later today.    Pertinent positives as noted in HPI/subjective.  All other systems were reviewed and are negative.      Objective      Vitals:   Temp:  [97.5 °F (36.4 °C)-98.4 °F (36.9 °C)] 98.4 °F (36.9 °C)  Heart Rate:  [51-76] 76  Resp:  [14-16] 14  BP: ()/(63-69) 118/69    Physical Exam:    General: Awake, alert, NAD  Eyes: PERRL, EOMI, conjunctive are clear  Cardiovascular: Regular rate and rhythm, no murmurs  Respiratory: Clear to auscultation bilaterally, no wheezing or rales, unlabored breathing  Abdomen: Soft, mild epigastric tenderness noted, positive bowel sounds, no guarding  Neurologic: A&O, CN grossly intact, moves all extremities spontaneously  Musculoskeletal: Normal range of motion, no deformities  Skin: Warm, dry, intact         Result Review    Result Review:  I have personally reviewed the results from the time of this admission to 2022 11:15 EDT and agree with these findings:  [x]  Laboratory  [x]  Microbiology  [x]  Radiology  []  EKG/Telemetry   []  Cardiology/Vascular   []  Pathology  [x]  Old records  []  Other:          Assessment & Plan      Brief Patient Summary:  Gerardo Hyatt is a 41 y.o. male who presented to [facility] on 2022 complaining of abdominal pain which radiates across his entire upper abdomen and through to his back. He has had some associated nausea, but denies any vomiting, diarrhea, fever or chills.  He has noticed a decrease in  his stool output over the past few days and notes that his stool is very soft.  Noted to have SBO on admission.  General surgery and GI consulted to also rule out Crohn's disease.      pantoprazole, 40 mg, Intravenous, Q AM  senna-docusate sodium, 2 tablet, Oral, BID       sodium chloride, 125 mL/hr, Last Rate: 125 mL/hr (06/19/22 1151)         Active Hospital Problems:  Active Hospital Problems    Diagnosis    • Abdominal pain, unspecified abdominal location    • SBO (small bowel obstruction) (HCC)    • GERD (gastroesophageal reflux disease)    • Eosinophilia      Plan:     SBO  -Imaging reports noted  -No need for NG tube at this time  -Continue supportive care with IV fluids, pain control, antiemetics  -General surgery and GI following  -GI plan for possible EGD today    Eosinophilia  -significant elevation with absolute eosinophils 3500  - 6/8/22 eosinophils 1500 in ED  -possibly eosinophilic gastritis or gastrointestinal disease (EGID)  -differential:  myeloproliferation/malignancy, infection (parasites, fungus), Inflammatory bowel disease, polyarteritis nodosa, Churg-Fabian  -Further labs as ordered, follow-up  -GI following, possible EGD today     GERD  -Continue PPI, monitor    DVT prophylaxis  -SCDs    CODE STATUS:    Code Status (Patient has no pulse and is not breathing): CPR (Attempt to Resuscitate)  Medical Interventions (Patient has pulse or is breathing): Full Support      Disposition: Pending improvement    Electronically signed by Ermelinda Awad DO, 06/20/22, 11:15 EDT.  [facility] Hospitalist Team

## 2022-06-20 NOTE — OP NOTE
ESOPHAGOGASTRODUODENOSCOPY WITH SMALL BOWEL ENTEROSCOPY Procedure Report    Patient Name:  Gerardo Hyatt  YOB: 1980    Date of Surgery:  6/20/2022     Pre-Op Diagnosis:  Abdominal pain, unspecified abdominal location [R10.9]  Eosinophilia, unspecified type [D72.10]  SBO (small bowel obstruction) ([facility]) [K56.609]    Post-Op Diagnosis:  1.  Normal small bowel enteroscopy to the proximal jejunum       Procedure/CPT® Codes:      Procedure(s):  ESOPHAGOGASTRODUODENOSCOPY WITH SMALL BOWEL ENTEROSCOPY    Staff:  Surgeon(s):  Ialn Robertson MD      Anesthesia: Monitored Anesthesia Care    Description of Procedure:  A description of the procedure as well as risks, benefits and alternative methods were explained to the patient who voiced understanding and signed the corresponding consent form. A physical exam was performed and vital signs were monitored throughout the procedure.    After informed consent was obtained, the patient was placed in the left lateral decubitus position and sedated as above.  The Olympus video gastroscope was inserted into the oropharynx and the esophagus was intubated without difficulty.  Examination of the esophagus, stomach, pylorus, duodenal bulb, and second portion of the duodenum was performed without difficulty. The scope was then retroflexed and the fundus was visualized. The procedure was not difficult and there were no immediate complications.  There was no blood loss.    Findings:  Esophagus: Normal  Stomach: Normal mucosa.  Biopsies taken to rule out eosinophilic gastritis.  Duodenum: Normal exam to the proximal jejunum.  Biopsies taken from the small bowel to rule out eosinophilic enteritis.    Impression:  1.  Normal small bowel enteroscopy    Recommendations:  1.  Regular diet as tolerated  2.  Follow-up on biopsies  3.  Okay for discharge home  4.  Follow-up in office  5.  If biopsies of stomach and small bowel are normal, consider small bowel follow-through  and/or video capsule endoscopy to look for an inflammatory or obstructive process  6.  We will see as needed      Ilan Robertson MD     Date: 6/20/2022    Time: 14:03 EDT      .

## 2022-06-20 NOTE — PLAN OF CARE
Goal Outcome Evaluation:  Plan of Care Reviewed With: patient        Progress: improving  Outcome Evaluation: Patient had EGD with biopsy.  Small bowel follow through ordered.

## 2022-06-20 NOTE — PROGRESS NOTES
General Surgery Progress Note    Name: Gerardo Hyatt ADMIT: 2022   : 1980  PCP: Kisha Linares APRN    MRN: [id number] LOS: 0 days   AGE/SEX: 41 y.o. male  ROOM: [facility]    Chief Complaint   Patient presents with   • Abdominal Pain     Subjective     41 y.o. male admitted with recurrent epigastric abdominal discomfort findings concerning for small bowel obstruction on CT scan.    He continues to have flatus and bowel function.  No nausea or vomiting.  The upper abdominal pain has subsided.  He is undergoing EGD today.  No significant fevers or chills.    Objective     Scheduled Medications:   pantoprazole, 40 mg, Intravenous, Q AM  senna-docusate sodium, 2 tablet, Oral, BID        Active Infusions:  sodium chloride, 125 mL/hr, Last Rate: 125 mL/hr (22 1343)        As Needed Medications:  •  senna-docusate sodium **AND** polyethylene glycol **AND** bisacodyl **AND** bisacodyl  •  ketorolac  •  Morphine **AND** naloxone  •  ondansetron **OR** ondansetron  •  [COMPLETED] Insert peripheral IV **AND** sodium chloride    Vital Signs  Vital Signs Patient Vitals for the past 24 hrs:   BP Temp Temp src Pulse Resp SpO2   22 1426 (!) 85/41 -- -- 67 18 100 %   22 1415 91/45 -- -- 68 19 100 %   22 1408 98/49 -- -- (!) 46 14 100 %   22 1403 (!) 71/30 -- -- 63 14 100 %   22 1255 101/54 98 °F (36.7 °C) Oral 73 14 99 %   22 1208 113/70 97.8 °F (36.6 °C) Oral 78 15 99 %   22 0446 118/69 98.4 °F (36.9 °C) Oral 76 14 96 %   22 0023 101/66 98.2 °F (36.8 °C) Oral 75 16 99 %   22 2032 95/64 97.5 °F (36.4 °C) Oral 54 14 98 %   22 1605 94/63 97.6 °F (36.4 °C) Oral 51 15 97 %       Physical Exam:  Physical Exam  Constitutional:       Appearance: Normal appearance.   HENT:      Head: Normocephalic and atraumatic.   Cardiovascular:      Rate and Rhythm: Normal rate.   Pulmonary:      Effort: Pulmonary effort is normal. No respiratory distress.    Abdominal:      General: There is no distension.      Palpations: Abdomen is soft.      Tenderness: There is no abdominal tenderness.   Neurological:      General: No focal deficit present.      Mental Status: He is alert. Mental status is at baseline.   Psychiatric:         Mood and Affect: Mood normal.         Behavior: Behavior normal.         Results Review:     CBC    Results from last 7 days   Lab Units 06/20/22  0240 06/19/22  0622   WBC 10*3/mm3 15.90* 14.40*   HEMOGLOBIN g/dL 14.0 13.6   PLATELETS 10*3/mm3 259 288     BMP   Results from last 7 days   Lab Units 06/20/22  0240 06/19/22  0647   SODIUM mmol/L 138 137   POTASSIUM mmol/L 4.3 3.9   CHLORIDE mmol/L 105 104   CO2 mmol/L 22.0 21.0*   BUN mg/dL 15 14   CREATININE mg/dL 0.86 0.87   GLUCOSE mg/dL 73 98     Radiology(recent) XR Abdomen 2+ VW with Chest 1 VW    Result Date: 6/19/2022  1.  Mildly dilated loops of small bowel at the left upper abdomen with fluid levels. Suspicious for early obstruction versus ileus. 2.  Other findings as above. Electronically signed by:  Kwabena Gomez D.O.  6/19/2022 5:27 AM    CT Abdomen Pelvis With Contrast    Result Date: 6/19/2022  1.Several dilated loops of small bowel with transition to decompressed small bowel in left abdomen, suggesting acute small bowel obstruction. 2.Small amount of pelvic free fluid.  Electronically Signed By-Denzel Her MD On:6/19/2022 10:48 AM This report was finalized on [id number] by  Denzel Her MD.      I reviewed the patient's new clinical results.    Assessment & Plan       Abdominal pain, unspecified abdominal location    GERD (gastroesophageal reflux disease)    Eosinophilia    SBO (small bowel obstruction) (HCC)      41 y.o. male with epigastric abdominal pain similar to an episode he had a few years ago.  There is no history of abdominal surgery.    EGD today if normal will consider small bowel follow-through tomorrow.  If if small bowel follow-through is suggestive  of ongoing bowel obstruction or recurrent symptoms will consider diagnostic laparoscopy or exploratory laparotomy to evaluate.        This note was created using Dragon Voice Recognition software.    Rodrigo Osborn MD  06/20/22  14:28 EDT

## 2022-06-20 NOTE — ANESTHESIA POSTPROCEDURE EVALUATION
Patient: Gerardo Hyatt    Procedure Summary     Date: 06/20/22 Room / Location: [facility] ENDOSCOPY 1 / [facility] ENDOSCOPY    Anesthesia Start: 1343 Anesthesia Stop: 1409    Procedure: with small bowel random biopsy and gastric random biopsy (N/A ) Diagnosis:       Abdominal pain, unspecified abdominal location      Eosinophilia, unspecified type      SBO (small bowel obstruction) (HCC)      (Abdominal pain, unspecified abdominal location [R10.9])      (Eosinophilia, unspecified type [D72.10])      (SBO (small bowel obstruction) (HCC) [K56.609])    Surgeons: Ilan Robertson MD Provider: Lloyd Arana MD    Anesthesia Type: MAC ASA Status: 2          Anesthesia Type: MAC    Vitals  Vitals Value Taken Time   BP 91/49 06/20/22 1433   Temp     Pulse 73 06/20/22 1433   Resp 16 06/20/22 1433   SpO2 100 % 06/20/22 1433           Post Anesthesia Care and Evaluation    Patient location during evaluation: PACU  Patient participation: complete - patient participated  Level of consciousness: awake  Pain scale: See nurse's notes for pain score.  Pain management: adequate    Airway patency: patent  Anesthetic complications: No anesthetic complications  PONV Status: none  Cardiovascular status: acceptable  Respiratory status: acceptable  Hydration status: acceptable    Comments: Patient seen and examined postoperatively; vital signs stable; SpO2 greater than or equal to 90%; cardiopulmonary status stable; nausea/vomiting adequately controlled; pain adequately controlled; no apparent anesthesia complications; patient discharged from anesthesia care when discharge criteria were met

## 2022-06-21 ENCOUNTER — APPOINTMENT (OUTPATIENT)
Dept: GENERAL RADIOLOGY | Facility: HOSPITAL | Age: 42
End: 2022-06-21

## 2022-06-21 LAB
ADV 40+41 DNA STL QL NAA+NON-PROBE: NOT DETECTED
ANION GAP SERPL CALCULATED.3IONS-SCNC: 9 MMOL/L (ref 5–15)
ASTRO TYP 1-8 RNA STL QL NAA+NON-PROBE: NOT DETECTED
BUN SERPL-MCNC: 12 MG/DL (ref 6–20)
BUN/CREAT SERPL: 18.8 (ref 7–25)
C CAYETANENSIS DNA STL QL NAA+NON-PROBE: NOT DETECTED
C COLI+JEJ+UPSA DNA STL QL NAA+NON-PROBE: NOT DETECTED
CALCIUM SPEC-SCNC: 8 MG/DL (ref 8.6–10.5)
CHLORIDE SERPL-SCNC: 108 MMOL/L (ref 98–107)
CO2 SERPL-SCNC: 23 MMOL/L (ref 22–29)
CREAT SERPL-MCNC: 0.64 MG/DL (ref 0.76–1.27)
CRYPTOSP DNA STL QL NAA+NON-PROBE: NOT DETECTED
DEPRECATED RDW RBC AUTO: 39.4 FL (ref 37–54)
E HISTOLYT DNA STL QL NAA+NON-PROBE: NOT DETECTED
EAEC PAA PLAS AGGR+AATA ST NAA+NON-PRB: NOT DETECTED
EC STX1+STX2 GENES STL QL NAA+NON-PROBE: NOT DETECTED
EGFRCR SERPLBLD CKD-EPI 2021: 122.7 ML/MIN/1.73
EOSINOPHIL # BLD MANUAL: 2.94 10*3/MM3 (ref 0–0.4)
EOSINOPHIL NFR BLD MANUAL: 27 % (ref 0.3–6.2)
EPEC EAE GENE STL QL NAA+NON-PROBE: NOT DETECTED
ERYTHROCYTE [DISTWIDTH] IN BLOOD BY AUTOMATED COUNT: 13.5 % (ref 12.3–15.4)
ETEC LTA+ST1A+ST1B TOX ST NAA+NON-PROBE: NOT DETECTED
G LAMBLIA DNA STL QL NAA+NON-PROBE: NOT DETECTED
GLUCOSE SERPL-MCNC: 118 MG/DL (ref 65–99)
HCT VFR BLD AUTO: 34.9 % (ref 37.5–51)
HGB BLD-MCNC: 12.4 G/DL (ref 13–17.7)
IGG SERPL-MCNC: 844 MG/DL (ref 603–1613)
IGG1 SER-MCNC: 414 MG/DL (ref 248–810)
IGG2 SER-MCNC: 259 MG/DL (ref 130–555)
IGG3 SER-MCNC: 56 MG/DL (ref 15–102)
IGG4 SER-MCNC: 14 MG/DL (ref 2–96)
LACTOFERRIN STL QL LA: POSITIVE
LYMPHOCYTES # BLD MANUAL: 2.18 10*3/MM3 (ref 0.7–3.1)
LYMPHOCYTES NFR BLD MANUAL: 3 % (ref 5–12)
MCH RBC QN AUTO: 29.3 PG (ref 26.6–33)
MCHC RBC AUTO-ENTMCNC: 35.5 G/DL (ref 31.5–35.7)
MCV RBC AUTO: 82.5 FL (ref 79–97)
MICROCYTES BLD QL: ABNORMAL
MONOCYTES # BLD: 0.33 10*3/MM3 (ref 0.1–0.9)
NEUTROPHILS # BLD AUTO: 5.45 10*3/MM3 (ref 1.7–7)
NEUTROPHILS NFR BLD MANUAL: 50 % (ref 42.7–76)
NOROVIRUS GI+II RNA STL QL NAA+NON-PROBE: NOT DETECTED
P SHIGELLOIDES DNA STL QL NAA+NON-PROBE: NOT DETECTED
PATHOLOGY REVIEW: YES
PLATELET # BLD AUTO: 275 10*3/MM3 (ref 140–450)
PMV BLD AUTO: 7.8 FL (ref 6–12)
POTASSIUM SERPL-SCNC: 4.3 MMOL/L (ref 3.5–5.2)
RBC # BLD AUTO: 4.23 10*6/MM3 (ref 4.14–5.8)
RVA RNA STL QL NAA+NON-PROBE: NOT DETECTED
S ENT+BONG DNA STL QL NAA+NON-PROBE: NOT DETECTED
SAPO I+II+IV+V RNA STL QL NAA+NON-PROBE: NOT DETECTED
SCAN SLIDE: NORMAL
SHIGELLA SP+EIEC IPAH ST NAA+NON-PROBE: NOT DETECTED
SMALL PLATELETS BLD QL SMEAR: ADEQUATE
SODIUM SERPL-SCNC: 140 MMOL/L (ref 136–145)
V CHOL+PARA+VUL DNA STL QL NAA+NON-PROBE: NOT DETECTED
V CHOLERAE DNA STL QL NAA+NON-PROBE: NOT DETECTED
VARIANT LYMPHS NFR BLD MANUAL: 20 % (ref 19.6–45.3)
WBC MORPH BLD: NORMAL
WBC NRBC COR # BLD: 10.9 10*3/MM3 (ref 3.4–10.8)
Y ENTEROCOL DNA STL QL NAA+NON-PROBE: NOT DETECTED

## 2022-06-21 PROCEDURE — 99213 OFFICE O/P EST LOW 20 MIN: CPT | Performed by: SURGERY

## 2022-06-21 PROCEDURE — 99225 PR SBSQ OBSERVATION CARE/DAY 25 MINUTES: CPT | Performed by: INTERNAL MEDICINE

## 2022-06-21 PROCEDURE — 85007 BL SMEAR W/DIFF WBC COUNT: CPT | Performed by: INTERNAL MEDICINE

## 2022-06-21 PROCEDURE — 74250 X-RAY XM SM INT 1CNTRST STD: CPT

## 2022-06-21 PROCEDURE — 0 IOPAMIDOL PER 1 ML: Performed by: INTERNAL MEDICINE

## 2022-06-21 PROCEDURE — G0378 HOSPITAL OBSERVATION PER HR: HCPCS

## 2022-06-21 PROCEDURE — 25010000002 KETOROLAC TROMETHAMINE PER 15 MG: Performed by: INTERNAL MEDICINE

## 2022-06-21 PROCEDURE — 80048 BASIC METABOLIC PNL TOTAL CA: CPT | Performed by: INTERNAL MEDICINE

## 2022-06-21 PROCEDURE — 85025 COMPLETE CBC W/AUTO DIFF WBC: CPT | Performed by: INTERNAL MEDICINE

## 2022-06-21 RX ORDER — SODIUM CHLORIDE 0.9 % (FLUSH) 0.9 %
3-10 SYRINGE (ML) INJECTION AS NEEDED
Status: DISCONTINUED | OUTPATIENT
Start: 2022-06-21 | End: 2022-06-22 | Stop reason: HOSPADM

## 2022-06-21 RX ORDER — SODIUM CHLORIDE 0.9 % (FLUSH) 0.9 %
3 SYRINGE (ML) INJECTION EVERY 12 HOURS SCHEDULED
Status: DISCONTINUED | OUTPATIENT
Start: 2022-06-21 | End: 2022-06-22 | Stop reason: HOSPADM

## 2022-06-21 RX ADMIN — IOPAMIDOL 300 ML: 755 INJECTION, SOLUTION INTRAVENOUS at 10:58

## 2022-06-21 RX ADMIN — Medication 3 ML: at 21:29

## 2022-06-21 RX ADMIN — PANTOPRAZOLE SODIUM 40 MG: 40 INJECTION, POWDER, FOR SOLUTION INTRAVENOUS at 05:52

## 2022-06-21 RX ADMIN — SODIUM CHLORIDE 125 ML/HR: 9 INJECTION, SOLUTION INTRAVENOUS at 00:05

## 2022-06-21 RX ADMIN — SODIUM CHLORIDE 125 ML/HR: 9 INJECTION, SOLUTION INTRAVENOUS at 09:32

## 2022-06-21 RX ADMIN — KETOROLAC TROMETHAMINE 30 MG: 30 INJECTION, SOLUTION INTRAMUSCULAR at 05:54

## 2022-06-21 RX ADMIN — SODIUM CHLORIDE 125 ML/HR: 9 INJECTION, SOLUTION INTRAVENOUS at 21:29

## 2022-06-21 NOTE — CASE MANAGEMENT/SOCIAL WORK
Discharge Planning Assessment   Greg     Patient Name: Gerardo Hyatt  MRN: [id number]  Today's Date: 6/21/2022    Admit Date: 6/19/2022     Discharge Needs Assessment     Row Name 06/21/22 1309       Living Environment    People in Home spouse    Name(s) of People in Home Sharri    Current Living Arrangements home    Primary Care Provided by self    Provides Primary Care For no one    Able to Return to Prior Arrangements yes       Resource/Environmental Concerns    Resource/Environmental Concerns none    Transportation Concerns none       Transition Planning    Patient/Family Anticipates Transition to home with family    Patient/Family Anticipated Services at Transition none    Transportation Anticipated family or friend will provide       Discharge Needs Assessment    Readmission Within the Last 30 Days no previous admission in last 30 days    Equipment Currently Used at Home none    Concerns to be Addressed discharge planning    Anticipated Changes Related to Illness none    Equipment Needed After Discharge none               Discharge Plan     Row Name 06/21/22 1300       Plan    Plan Anticipate Routine Home    Patient/Family in Agreement with Plan yes    Plan Comments Met with Patient at bedside Lives at home with wife. IADL's. Wife can drive home. PCP and Pharmacy verified, Able to afford medications. Denies any needs d/c barriers: SBFT                Expected Discharge Date and Time     Expected Discharge Date Expected Discharge Time    Jun 21, 2022          Demographic Summary     Row Name 06/21/22 1308       General Information    Admission Type observation    Arrived From emergency department    Referral Source admission list    Reason for Consult discharge planning    Preferred Language English               Functional Status     Row Name 06/21/22 1308       Functional Status    Usual Activity Tolerance good    Current Activity Tolerance good       Functional Status, IADL    Medications independent     Meal Preparation independent    Housekeeping independent    Laundry independent    Shopping independent       Mental Status    General Appearance WDL WDL       Mental Status Summary    Recent Changes in Mental Status/Cognitive Functioning no changes                         Zoila Lucio, RN

## 2022-06-21 NOTE — PLAN OF CARE
Goal Outcome Evaluation:  Pt up ambulating to bathroom, pain controlled with oral pain medication, will advance diet today per orders, VSS, will continue to monitor.

## 2022-06-21 NOTE — PLAN OF CARE
Goal Outcome Evaluation:              Outcome Evaluation: Patient NPO after midnight for SBFT scheduled today, family at bedside, no C/O of pain

## 2022-06-21 NOTE — PROGRESS NOTES
[facility] Medicine Services Daily Progress Note    Patient Name: Gerardo Hyatt  : 1980  MRN: [id number]  Primary Care Physician:  Kisha Linares APRN  Date of admission: 2022      Subjective      Chief Complaint: Abdominal pain    Patient seen and examined this morning.  He is doing better this morning but had worsening abdominal pain overnight without any nausea or vomiting.  Had 2 small bowel movements overnight.  Going for small bowel follow-through today.  No other complaints.  Wife at bedside, all questions answered.    Pertinent positives as noted in HPI/subjective.  All other systems were reviewed and are negative.      Objective      Vitals:   Temp:  [97.5 °F (36.4 °C)-98.3 °F (36.8 °C)] 98.3 °F (36.8 °C)  Heart Rate:  [46-78] 69  Resp:  [14-19] 17  BP: ()/(30-73) 109/73  Flow (L/min):  [8] 8    Physical Exam:    General: Awake, alert, NAD  Eyes: PERRL, EOMI, conjunctive are clear  Cardiovascular: Regular rate and rhythm, no murmurs  Respiratory: Clear to auscultation bilaterally, no wheezing or rales, unlabored breathing  Abdomen: Soft, mild epigastric tenderness noted, positive bowel sounds, no guarding  Neurologic: A&O, CN grossly intact, moves all extremities spontaneously  Musculoskeletal: Normal range of motion, no deformities  Skin: Warm, dry, intact         Result Review    Result Review:  I have personally reviewed the results from the time of this admission to 2022 10:21 EDT and agree with these findings:  [x]  Laboratory  [x]  Microbiology  [x]  Radiology  []  EKG/Telemetry   []  Cardiology/Vascular   []  Pathology  []  Old records  []  Other:          Assessment & Plan      Brief Patient Summary:  Gerardo Hyatt is a 41 y.o. male who presented to [facility] on 2022 complaining of abdominal pain which radiates across his entire upper abdomen and through to his back. He has had some associated nausea, but denies any vomiting,  diarrhea, fever or chills.  He has noticed a decrease in his stool output over the past few days and notes that his stool is very soft.  Noted to have SBO on admission.  General surgery and GI consulted to also rule out Crohn's disease.      pantoprazole, 40 mg, Intravenous, Q AM  senna-docusate sodium, 2 tablet, Oral, BID       sodium chloride, 125 mL/hr, Last Rate: 125 mL/hr (06/21/22 0932)         Active Hospital Problems:  Active Hospital Problems    Diagnosis    • Abdominal pain, unspecified abdominal location    • SBO (small bowel obstruction) (HCC)    • GERD (gastroesophageal reflux disease)    • Eosinophilia      Plan:     SBO  -Imaging reports noted  -No need for NG tube at this time  -Continue supportive care with IV fluids, pain control, antiemetics  -s/p EGD on 6/20, no significant findings noted, biopsies taken  -GI signed off  -General surgery following, plan for small bowel follow-through today  -Diet per general surgery    Eosinophilia  -significant elevation with absolute eosinophils 3500  - 6/8/22 eosinophils 1500 in ED  -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  [facility] Hospitalist Team

## 2022-06-21 NOTE — PROGRESS NOTES
General Surgery Progress Note    Name: Gerardo Hyatt ADMIT: 2022   : 1980  PCP: Kisha Linares APRN    MRN: [id number] LOS: 0 days   AGE/SEX: 41 y.o. male  ROOM: [address] [place] [address]    Chief Complaint   Patient presents with   • Abdominal Pain     Subjective     41 y.o. male admitted with recurrent epigastric abdominal discomfort findings concerning for small bowel obstruction on CT scan.    Tolerating some liquids.  No severe nausea or vomiting having bowel function.  Said he got cleaned out from the small bowel follow-through.  Having some mild epigastric abdominal fullness and discomfort    Objective     Scheduled Medications:   pantoprazole, 40 mg, Intravenous, Q AM  senna-docusate sodium, 2 tablet, Oral, BID        Active Infusions:  sodium chloride, 125 mL/hr, Last Rate: 125 mL/hr (22 0932)        As Needed Medications:  •  senna-docusate sodium **AND** polyethylene glycol **AND** bisacodyl **AND** bisacodyl  •  Morphine **AND** naloxone  •  ondansetron **OR** ondansetron  •  [COMPLETED] Insert peripheral IV **AND** sodium chloride    Vital Signs  Vital Signs   Patient Vitals for the past 24 hrs:   BP Temp Temp src Pulse Resp SpO2   22 0451 109/73 98.3 °F (36.8 °C) Oral 69 17 96 %   22 2055 97/61 98.1 °F (36.7 °C) Oral 75 16 97 %   22 1458 105/64 97.5 °F (36.4 °C) Oral 67 16 99 %       Physical Exam:  Physical Exam  Constitutional:       Appearance: Normal appearance.   HENT:      Head: Normocephalic and atraumatic.   Cardiovascular:      Rate and Rhythm: Normal rate.   Pulmonary:      Effort: Pulmonary effort is normal. No respiratory distress.   Abdominal:      General: There is no distension.      Palpations: Abdomen is soft.      Tenderness: There is no abdominal tenderness.   Neurological:      General: No focal deficit present.      Mental Status: He is alert. Mental status is at baseline.   Psychiatric:         Mood and Affect: Mood normal.         Behavior:  Behavior normal.         Results Review:     CBC    Results from last 7 days   Lab Units 06/21/22  0237 06/20/22  0240 06/19/22  0622   WBC 10*3/mm3 10.90* 15.90* 14.40*   HEMOGLOBIN g/dL 12.4* 14.0 13.6   PLATELETS 10*3/mm3 275 259 288     BMP   Results from last 7 days   Lab Units 06/21/22  0237 06/20/22  0240 06/19/22  0647   SODIUM mmol/L 140 138 137   POTASSIUM mmol/L 4.3 4.3 3.9   CHLORIDE mmol/L 108* 105 104   CO2 mmol/L 23.0 22.0 21.0*   BUN mg/dL 12 15 14   CREATININE mg/dL 0.64* 0.86 0.87   GLUCOSE mg/dL 118* 73 98     Radiology(recent) FL Small Bowel Follow Through Single-Contrast    Result Date: 6/21/2022  Abnormally dilated small bowel loops with wall thickening or edema in the mid abdomen left of midline, corresponding to findings on the recent CT abdomen. No discrete transition zone is seen on today's fluoroscopy study. Contrast reaches the ascending colon at 45 minutes, without evidence of high-grade obstruction.  Electronically Signed By-Tabby Medley MD On:6/21/2022 12:42 PM This report was finalized on [id number] by  Tabby Medley MD.      I reviewed the patient's new clinical results.    Assessment & Plan       Abdominal pain, unspecified abdominal location    GERD (gastroesophageal reflux disease)    Eosinophilia    SBO (small bowel obstruction) (HCC)      41 y.o. male with epigastric abdominal pain similar to an episode he had a few years ago.  There is no history of abdominal surgery.    Small bowel follow-through showed some mild dilation of the proximal small bowel but no transition point and there was transition of contrast to the bowel fairly rapidly at 45 minutes  I counseled him about the findings and the treatment options moving forward.  For now we will hold off on surgery.  If recurrent symptoms we will plan on laparoscopy or laparotomy.  Okay to follow-up with me in about 2 weeks to rediscuss symptoms and to consider interval surgical therapy as needed      This note was  created using Dragon Voice Recognition software.    Rodrigo Osborn MD  06/21/22  14:39 EDT

## 2022-06-22 ENCOUNTER — READMISSION MANAGEMENT (OUTPATIENT)
Dept: CALL CENTER | Facility: HOSPITAL | Age: 42
End: 2022-06-22

## 2022-06-22 VITALS
HEART RATE: 61 BPM | BODY MASS INDEX: 24.86 KG/M2 | WEIGHT: 164.02 LBS | SYSTOLIC BLOOD PRESSURE: 103 MMHG | HEIGHT: 68 IN | RESPIRATION RATE: 14 BRPM | DIASTOLIC BLOOD PRESSURE: 66 MMHG | OXYGEN SATURATION: 95 % | TEMPERATURE: 97.7 F

## 2022-06-22 LAB
C1INH SERPL-MCNC: 23 MG/DL (ref 21–39)
IGA SERPL-MCNC: 82 MG/DL (ref 90–386)
IGA1 SER-MCNC: 67.9 MG/DL (ref 73.2–301.2)
IGA2 SER-MCNC: 14.6 MG/DL (ref 13.4–97.9)
LAB AP CASE REPORT: NORMAL
PATH REPORT.FINAL DX SPEC: NORMAL
PATH REPORT.GROSS SPEC: NORMAL
TRYPTASE SERPL-MCNC: 7.9 UG/L (ref 2.2–13.2)

## 2022-06-22 PROCEDURE — 99213 OFFICE O/P EST LOW 20 MIN: CPT | Performed by: SURGERY

## 2022-06-22 PROCEDURE — 99217 PR OBSERVATION CARE DISCHARGE MANAGEMENT: CPT | Performed by: INTERNAL MEDICINE

## 2022-06-22 PROCEDURE — G0378 HOSPITAL OBSERVATION PER HR: HCPCS

## 2022-06-22 RX ORDER — POLYETHYLENE GLYCOL 3350 17 G/17G
17 POWDER, FOR SOLUTION ORAL DAILY PRN
Start: 2022-06-22

## 2022-06-22 RX ORDER — ONDANSETRON 4 MG/1
4 TABLET, FILM COATED ORAL EVERY 6 HOURS PRN
Qty: 15 TABLET | Refills: 0 | Status: SHIPPED | OUTPATIENT
Start: 2022-06-22

## 2022-06-22 RX ORDER — AMOXICILLIN 250 MG
1 CAPSULE ORAL 2 TIMES DAILY
Start: 2022-06-22

## 2022-06-22 RX ADMIN — PANTOPRAZOLE SODIUM 40 MG: 40 INJECTION, POWDER, FOR SOLUTION INTRAVENOUS at 05:38

## 2022-06-22 NOTE — OUTREACH NOTE
Prep Survey    Flowsheet Row Responses   [facility]   Is LACE score < 7 ? Yes   Emergency Room discharge w/ pulse ox? No   Eligibility [facility]   Date of Admission 06/19/22   Date of Discharge 06/22/22   Discharge Disposition Home or Self Care   Discharge diagnosis SBO   Does the patient have one of the following disease processes/diagnoses(primary or secondary)? Other   Does the patient have Home health ordered? No   Is there a DME ordered? No   Prep survey completed? Yes          REMY GAYTAN - Registered Nurse

## 2022-06-22 NOTE — PROGRESS NOTES
General Surgery Progress Note    Name: Gerardo Hyatt ADMIT: 2022   : 1980  PCP: Kisha Linares APRN    MRN: [id number] LOS: 0 days   AGE/SEX: 41 y.o. male  ROOM: [address] [place] [address]    Chief Complaint   Patient presents with   • Abdominal Pain     Subjective     41 y.o. male admitted with recurrent epigastric abdominal discomfort findings concerning for small bowel obstruction on CT scan.    Tolerating the low residue diet.  No significant recurrence of symptoms.    Objective     Scheduled Medications:   pantoprazole, 40 mg, Intravenous, Q AM  senna-docusate sodium, 2 tablet, Oral, BID  sodium chloride, 3 mL, Intravenous, Q12H        Active Infusions:  sodium chloride, 125 mL/hr, Last Rate: 125 mL/hr (22 0340)        As Needed Medications:  •  senna-docusate sodium **AND** polyethylene glycol **AND** bisacodyl **AND** bisacodyl  •  Morphine **AND** naloxone  •  ondansetron **OR** ondansetron  •  [COMPLETED] Insert peripheral IV **AND** sodium chloride  •  sodium chloride    Vital Signs  Vital Signs   Patient Vitals for the past 24 hrs:   BP Temp Temp src Pulse Resp SpO2   22 0454 103/66 97.7 °F (36.5 °C) Oral 61 14 95 %   22 2056 106/70 97.6 °F (36.4 °C) Oral 68 15 98 %   22 1715 112/65 97.8 °F (36.6 °C) Oral 70 17 99 %       Physical Exam:  Physical Exam  Constitutional:       Appearance: Normal appearance.   HENT:      Head: Normocephalic and atraumatic.   Cardiovascular:      Rate and Rhythm: Normal rate.   Pulmonary:      Effort: Pulmonary effort is normal. No respiratory distress.   Abdominal:      General: There is no distension.      Palpations: Abdomen is soft.      Tenderness: There is no abdominal tenderness.   Neurological:      General: No focal deficit present.      Mental Status: He is alert. Mental status is at baseline.   Psychiatric:         Mood and Affect: Mood normal.         Behavior: Behavior normal.         Results Review:     CBC    Results from  last 7 days   Lab Units 06/21/22  0237 06/20/22  0240 06/19/22  0622   WBC 10*3/mm3 10.90* 15.90* 14.40*   HEMOGLOBIN g/dL 12.4* 14.0 13.6   PLATELETS 10*3/mm3 275 259 288     BMP   Results from last 7 days   Lab Units 06/21/22  0237 06/20/22  0240 06/19/22  0647   SODIUM mmol/L 140 138 137   POTASSIUM mmol/L 4.3 4.3 3.9   CHLORIDE mmol/L 108* 105 104   CO2 mmol/L 23.0 22.0 21.0*   BUN mg/dL 12 15 14   CREATININE mg/dL 0.64* 0.86 0.87   GLUCOSE mg/dL 118* 73 98     Radiology(recent) FL Small Bowel Follow Through Single-Contrast    Result Date: 6/21/2022  Abnormally dilated small bowel loops with wall thickening or edema in the mid abdomen left of midline, corresponding to findings on the recent CT abdomen. No discrete transition zone is seen on today's fluoroscopy study. Contrast reaches the ascending colon at 45 minutes, without evidence of high-grade obstruction.  Electronically Signed By-Tabby Medley MD On:6/21/2022 12:42 PM This report was finalized on [id number] by  Tabby Medley MD.      I reviewed the patient's new clinical results.    Assessment & Plan       Abdominal pain, unspecified abdominal location    GERD (gastroesophageal reflux disease)    Eosinophilia    SBO (small bowel obstruction) (HCC)      41 y.o. male with epigastric abdominal pain similar to an episode he had a few years ago.  There is no history of abdominal surgery.    Okay for my standpoint for discharge  Follow-up in the office in 2 weeks      This note was created using Dragon Voice Recognition software.    Rodrigo Osborn MD  06/22/22  08:53 EDT

## 2022-06-22 NOTE — PLAN OF CARE
Goal Outcome Evaluation:  Plan of Care Reviewed With: patient  Pt ambulating ad rolando with no complaints of pain, some mild abdominal tenderness, no nausea or vomiting. VSS, will continue to monitor

## 2022-06-22 NOTE — DISCHARGE SUMMARY
[facility] Medicine Services  DISCHARGE SUMMARY    Patient Name: Gerardo Hyatt  : 1980  MRN: [id number]    Date of Admission: 2022  Date of Discharge: 2022  Primary Care Physician: Kisha Linares APRN      Presenting Problem:   Abdominal pain, unspecified abdominal location [R10.9]    Active and Resolved Hospital Problems:  Active Hospital Problems    Diagnosis POA   • **SBO (small bowel obstruction) (HCC) [K56.609] Yes   • Abdominal pain, unspecified abdominal location [R10.9] Yes   • GERD (gastroesophageal reflux disease) [K21.9] Yes   • Eosinophilia [D72.10] Yes      Resolved Hospital Problems   No resolved problems to display.         Hospital Course     Hospital Course:  Gerardo Hyatt is a 41 y.o. male who presented to [facility] on 2022 complaining of abdominal pain which radiates across his entire upper abdomen and through to his back. He has had some associated nausea, but denies any vomiting, diarrhea, fever or chills.  He has noticed a decrease in his stool output over the past few days and notes that his stool is very soft.  Noted to have SBO on admission.  General surgery and GI consulted to also rule out Crohn's disease.  Patient underwent EGD on  which did not show any significant findings, biopsies were taken.  GI signed off.  Patient continued to have improvement, underwent small bowel follow-through which showed improvement in SBO.  Patient tolerating diet, had multiple bowel movements during this admission.  Okay to discharge per general surgery with outpatient follow-up.  Patient is clinically improved and stable to discharge home today with follow-up with PCP, GI, and general surgery as an outpatient.    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 (EGID)  -differential:  myeloproliferation/malignancy, infection (parasites, fungus), Inflammatory bowel disease, polyarteritis nodosa, Churg-Fabian  -Further labs as ordered, follow-up  -GI signed off, EGD as above     GERD  -Continue PPI, monitor      DISCHARGE Follow Up Recommendations for labs and diagnostics:   Follow-up with PCP, GI, and general surgery    Reasons For Change In Medications and Indications for New Medications:  No significant changes    Day of Discharge     Vital Signs:  Temp:  [97.6 °F (36.4 °C)-97.8 °F (36.6 °C)] 97.7 °F (36.5 °C)  Heart Rate:  [61-70] 61  Resp:  [14-17] 14  BP: (103-112)/(65-70) 103/66    Physical Exam:  General: Awake, alert, NAD  Eyes: PERRL, EOMI, conjunctive are clear  Cardiovascular: Regular rate and rhythm, no murmurs  Respiratory: Clear to auscultation bilaterally, no wheezing or rales, unlabored breathing  Abdomen: Soft, nontender, positive bowel sounds, no guarding  Neurologic: A&O, CN grossly intact, moves all extremities spontaneously  Musculoskeletal: Normal range of motion, no deformities  Skin: Warm, dry, intact        Pertinent  and/or Most Recent Results     LAB RESULTS:      Lab 06/21/22  0237 06/20/22  0240 06/19/22  1112 06/19/22  0622   WBC 10.90* 15.90*  --  14.40*   HEMOGLOBIN 12.4* 14.0  --  13.6   HEMATOCRIT 34.9* 40.6  --  39.0   PLATELETS 275 259  --  288   NEUTROS ABS 5.45 9.60*  --  7.60*   LYMPHS ABS  --  2.00  --  2.30   MONOS ABS  --  1.00*  --  0.90   EOS ABS 2.94* 3.30*  --  3.50*   MCV 82.5 83.4  --  82.2   SED RATE  --   --  2  --    CRP  --   --  0.40  --          Lab 06/21/22  0237 06/20/22  0240 06/19/22  0647   SODIUM 140 138 137   POTASSIUM 4.3  4.3 3.9   CHLORIDE 108* 105 104   CO2 23.0 22.0 21.0*   ANION GAP 9.0 11.0 12.0   BUN 12 15 14   CREATININE 0.64* 0.86 0.87   EGFR 122.7 112.3 111.9   GLUCOSE 118* 73 98   CALCIUM 8.0* 8.3* 8.7         Lab 06/20/22  0240 06/19/22  0647 06/19/22  0550   TOTAL PROTEIN 5.9* 6.8  --    ALBUMIN 3.60 4.30  --    GLOBULIN 2.3 2.5  --    ALT (SGPT) 12 15  --    AST (SGOT) 15 16  --    BILIRUBIN 0.7 0.5  --    ALK PHOS 56 59  --    LIPASE  --   --  33         Lab 06/19/22  0647   TROPONIN T <0.010             Lab 06/19/22  1112   IRON 42*   IRON SATURATION 13*   TIBC 313   TRANSFERRIN 210   VITAMIN B 12 472         Brief Urine Lab Results  (Last result in the past 365 days)      Color   Clarity   Blood   Leuk Est   Nitrite   Protein   CREAT   Urine HCG        06/19/22 0738 Yellow   Cloudy   Negative   Negative   Negative   Negative               Microbiology Results (last 10 days)     Procedure Component Value - Date/Time    Gastrointestinal Panel, PCR - Stool, Per Rectum [[id number]]  (Normal) Collected: 06/20/22 2145    Lab Status: Final result Specimen: Stool from Per Rectum Updated: 06/21/22 0737     Campylobacter Not Detected     Plesiomonas shigelloides Not Detected     Salmonella Not Detected     Vibrio Not Detected     Vibrio cholerae Not Detected     Yersinia enterocolitica Not Detected     Enteroaggregative E. coli (EAEC) Not Detected     Enteropathogenic E. coli (EPEC) Not Detected     Enterotoxigenic E. coli (ETEC) lt/st Not Detected     Shiga-like toxin-producing E. coli (STEC) stx1/stx2 Not Detected     Shigella/Enteroinvasive E. coli (EIEC) Not Detected     Cryptosporidium Not Detected     Cyclospora cayetanensis Not Detected     Entamoeba histolytica Not Detected     Giardia lamblia Not Detected     Adenovirus F40/41 Not Detected     Astrovirus Not Detected     Norovirus GI/GII Not Detected     Rotavirus A Not Detected     Sapovirus (I, II, IV or V) Not Detected    Narrative:      If Aeromonas, Staphylococcus  aureus or Bacillus cereus are suspected, please order [id number]: Stool Culture, Aeromonas, S aureus, B Cereus.    COVID PRE-OP / PRE-PROCEDURE SCREENING ORDER (NO ISOLATION) - Swab, Nasopharynx [[id number]]  (Normal) Collected: 06/19/22 0953    Lab Status: Final result Specimen: Swab from Nasopharynx Updated: 06/19/22 1018    Narrative:      The following orders were created for panel order COVID PRE-OP / PRE-PROCEDURE SCREENING ORDER (NO ISOLATION) - Swab, Nasopharynx.  Procedure                               Abnormality         Status                     ---------                               -----------         ------                     COVID-19,CEPHEID/CORBY,CO...[[id number]]  Normal              Final result                 Please view results for these tests on the individual orders.    COVID-19,CEPHEID/CORBY,COR/KIRSTEN/PAD/PRAVEEN IN-HOUSE(OR EMERGENT/ADD-ON),NP SWAB IN TRANSPORT MEDIA 3-4 HR TAT, RT-PCR - Swab, Nasopharynx [[id number]]  (Normal) Collected: 06/19/22 0953    Lab Status: Final result Specimen: Swab from Nasopharynx Updated: 06/19/22 1018     COVID19 Not Detected    Narrative:      Fact sheet for providers: https://www.fda.gov/media/944911/download     Fact sheet for patients: https://www.fda.gov/media/227862/download  Fact sheet for providers: https://www.fda.gov/media/244469/download    Fact sheet for patients: https://www.fda.gov/media/623622/download    Test performed by PCR.          XR Abdomen 2+ VW with Chest 1 VW    Result Date: 6/19/2022  Impression: 1.  Mildly dilated loops of small bowel at the left upper abdomen with fluid levels. Suspicious for early obstruction versus ileus. 2.  Other findings as above. Electronically signed by:  Kwabena Gomez D.O.  6/19/2022 5:27 AM    CT Abdomen Pelvis With Contrast    Result Date: 6/19/2022  Impression: 1.Several dilated loops of small bowel with transition to decompressed small bowel in left abdomen, suggesting acute small bowel obstruction. 2.Small  amount of pelvic free fluid.  Electronically Signed By-Denzel Her MD On:6/19/2022 10:48 AM This report was finalized on [id number] by  Denzel Her MD.    CT Abdomen Pelvis With Contrast    Result Date: 6/8/2022  Impression:  1. No acute findings in the abdomen or pelvis.    Electronically Signed By-Tabby Medley MD On:6/8/2022 3:56 PM This report was finalized on [id number] by  Tabby Medley MD.    FL Small Bowel Follow Through Single-Contrast    Result Date: 6/21/2022  Impression: Abnormally dilated small bowel loops with wall thickening or edema in the mid abdomen left of midline, corresponding to findings on the recent CT abdomen. No discrete transition zone is seen on today's fluoroscopy study. Contrast reaches the ascending colon at 45 minutes, without evidence of high-grade obstruction.  Electronically Signed By-Tabby Medley MD On:6/21/2022 12:42 PM This report was finalized on [id number] by  Tabby Medley MD.                  Labs Pending at Discharge:  Pending Labs     Order Current Status    Pathology Consultation Collected (06/21/22 1472)    Alpha-Gal, IgE, Serum In process    C1 Esterase Inhibitor, Serum In process    Flow Cytometry, Blood In process    IgE In process    Tissue Pathology Exam In process          Procedures Performed  Procedure(s):  with small bowel random biopsy and gastric random biopsy  06/20 1345 SMALL BOWEL ENTEROSCOPY      Consults:   Consults     Date and Time Order Name Status Description    6/19/2022 11:36 AM Inpatient General Surgery Consult      6/19/2022  8:13 AM Gastroenterology (on-call MD unless specified) Completed     6/19/2022  8:03 AM Hospitalist (on-call MD unless specified)              Discharge Details        Discharge Medications      New Medications      Instructions Start Date   ondansetron 4 MG tablet  Commonly known as: ZOFRAN   4 mg, Oral, Every 6 Hours PRN      polyethylene glycol 17 g packet  Commonly known as: MIRALAX   17 g,  Oral, Daily PRN      sennosides-docusate 8.6-50 MG per tablet  Commonly known as: PERICOLACE   1 tablet, Oral, 2 Times Daily         Continue These Medications      Instructions Start Date   acetaminophen 500 MG tablet  Commonly known as: TYLENOL   500 mg, Oral, 2 Times Daily PRN      omeprazole 20 MG capsule  Commonly known as: priLOSEC   20 mg, Oral, Daily PRN             No Known Allergies      Discharge Disposition:  Home or Self Care    Diet:  Hospital:  Diet Order   Procedures   • Diet Gastrointestinal; Low Residue         Discharge Activity:         CODE STATUS:  Code Status and Medical Interventions:   Ordered at: 06/19/22 1038     Code Status (Patient has no pulse and is not breathing):    CPR (Attempt to Resuscitate)     Medical Interventions (Patient has pulse or is breathing):    Full Support         No future appointments.        Time spent on Discharge including face to face service:  25 minutes    Signature:    Electronically signed by Ermelinda Awad DO, 06/22/22, 9:03 AM EDT.

## 2022-06-22 NOTE — PLAN OF CARE
Goal Outcome Evaluation:      Pt tolerated Low residue dinner well. Bms loose and frequent. Will most likely d/c home in AM

## 2022-06-23 ENCOUNTER — TRANSITIONAL CARE MANAGEMENT TELEPHONE ENCOUNTER (OUTPATIENT)
Dept: CALL CENTER | Facility: HOSPITAL | Age: 42
End: 2022-06-23

## 2022-06-23 LAB — REF LAB TEST METHOD: NORMAL

## 2022-06-23 NOTE — OUTREACH NOTE
Call Center TCM Note    Flowsheet Row Responses   [facility] patient discharged from? [facility]   Does the patient have one of the following disease processes/diagnoses(primary or secondary)? Other   TCM attempt successful? No   Unsuccessful attempts Attempt 2          Molly Metcalf RN    6/23/2022, 14:14 EDT

## 2022-06-23 NOTE — OUTREACH NOTE
Call Center TCM Note    Flowsheet Row Responses   [facility] patient discharged from? [facility]   Does the patient have one of the following disease processes/diagnoses(primary or secondary)? Other   TCM attempt successful? No   Unsuccessful attempts Attempt 1          Molly Metcalf RN    6/23/2022, 13:17 EDT

## 2022-06-24 ENCOUNTER — TRANSITIONAL CARE MANAGEMENT TELEPHONE ENCOUNTER (OUTPATIENT)
Dept: CALL CENTER | Facility: HOSPITAL | Age: 42
End: 2022-06-24

## 2022-06-24 NOTE — OUTREACH NOTE
Call Center TCM Note    Flowsheet Row Responses   [facility] patient discharged from? [facility]   Does the patient have one of the following disease processes/diagnoses(primary or secondary)? Other   TCM attempt successful? Yes   Call start time 1446   Call end time 1500   Discharge diagnosis SBO   Meds reviewed with patient/caregiver? Yes   Is the patient having any side effects they believe may be caused by any medication additions or changes? No   Does the patient have all medications ordered at discharge? No   What is keeping the patient from filling the prescriptions? Lost script/didn't receive  [Patient reports script of Zofran (limit of 9 per insurance picked up already).  Called pharmacy. Other two meds are OTC. Explained to patient previously. ]   Nursing Interventions No intervention needed, Nurse provided patient education, Nurse called pharmacy   Is the patient taking all medications as directed (includes completed medication regime)? No   Nursing Interventions Nurse provided patient education   Does the patient have a primary care provider?  Yes   Does the patient have an appointment with their PCP within 7 days of discharge? No   Comments regarding PCP Declines a PCP followup appt at this time.    What is preventing the patient from scheduling follow up appointments within 7 days of discharge? Haven't had time   Nursing Interventions Educated patient on importance of making appointment, Advised patient to make appointment   Has the patient kept scheduled appointments due by today? N/A   Psychosocial issues? No   Did the patient receive a copy of their discharge instructions? Yes   Nursing interventions Reviewed instructions with patient   What is the patient's perception of their health status since discharge? Improving   Is the patient/caregiver able to teach back signs and symptoms related to disease process for when to call PCP? Yes   Is the patient/caregiver able to teach back signs and symptoms  related to disease process for when to call 911? Yes   Is the patient/caregiver able to teach back the hierarchy of who to call/visit for symptoms/problems? PCP, Specialist, Home health nurse, Urgent Care, ED, 911 Yes   If the patient is a current smoker, are they able to teach back resources for cessation? Not a smoker   TCM call completed? Yes          Jose Lynn RN    6/24/2022, 15:00 EDT

## 2022-06-25 LAB
ALPHA-GAL IGE QN: <0.1 KU/L
IGE SERPL-ACNC: 16 IU/ML (ref 6–495)

## 2023-04-19 ENCOUNTER — HOSPITAL ENCOUNTER (EMERGENCY)
Facility: HOSPITAL | Age: 43
Discharge: HOME | End: 2023-04-19
Admitting: EMERGENCY MEDICINE
Payer: COMMERCIAL

## 2023-04-19 VITALS
WEIGHT: 158.51 LBS | SYSTOLIC BLOOD PRESSURE: 105 MMHG | DIASTOLIC BLOOD PRESSURE: 56 MMHG | OXYGEN SATURATION: 97 % | HEIGHT: 68 IN | BODY MASS INDEX: 24.02 KG/M2 | HEART RATE: 60 BPM | RESPIRATION RATE: 16 BRPM | TEMPERATURE: 99 F

## 2023-04-19 DIAGNOSIS — K64.9 ACUTE HEMORRHOID: ICD-10-CM

## 2023-04-19 DIAGNOSIS — K62.89 RECTAL PAIN: Primary | ICD-10-CM

## 2023-04-19 PROCEDURE — 99283 EMERGENCY DEPT VISIT LOW MDM: CPT

## 2023-04-19 RX ORDER — HYDROCODONE BITARTRATE AND ACETAMINOPHEN 5; 325 MG/1; MG/1
1 TABLET ORAL ONCE AS NEEDED
Status: COMPLETED | OUTPATIENT
Start: 2023-04-19 | End: 2023-04-19

## 2023-04-19 RX ORDER — HYDROCORTISONE ACETATE 25 MG/1
25 SUPPOSITORY RECTAL 2 TIMES DAILY PRN
Qty: 12 SUPPOSITORY | Refills: 0 | Status: SHIPPED | OUTPATIENT
Start: 2023-04-19

## 2023-04-19 RX ORDER — HYDROCORTISONE ACETATE 25 MG/1
25 SUPPOSITORY RECTAL ONCE
Status: COMPLETED | OUTPATIENT
Start: 2023-04-19 | End: 2023-04-19

## 2023-04-19 RX ORDER — HYDROCODONE BITARTRATE AND ACETAMINOPHEN 5; 325 MG/1; MG/1
1 TABLET ORAL EVERY 6 HOURS PRN
Qty: 12 TABLET | Refills: 0 | Status: SHIPPED | OUTPATIENT
Start: 2023-04-19 | End: 2023-04-27

## 2023-04-19 RX ADMIN — HYDROCORTISONE ACETATE 25 MG: 25 SUPPOSITORY RECTAL at 02:29

## 2023-04-19 RX ADMIN — HYDROCODONE BITARTRATE AND ACETAMINOPHEN 1 TABLET: 5; 325 TABLET ORAL at 02:29

## 2023-04-19 NOTE — ED PROVIDER NOTES
Subjective   History of Present Illness  Patient presents with:  Hemorrhoids    Kisha Linares, JASEN   No LMP for male patient.  Patient is a 42-year-old male who presents the ED with complaint of rectal pain related to hemorrhoid.  Patient reports he had similar symptoms in the past and required incision of the hemorrhoids.  Patient denies any anticoagulants.  No blood in the stools.  No fever chills        Review of Systems   Constitutional: Negative for chills and fever.   Gastrointestinal: Positive for rectal pain. Negative for abdominal pain and blood in stool.   Skin: Negative for color change, rash and wound.       Past Medical History:   Diagnosis Date   • GERD (gastroesophageal reflux disease)        No Known Allergies    Past Surgical History:   Procedure Laterality Date   • ABDOMINAL SURGERY     • COLONOSCOPY     • ENTEROSCOPY SMALL BOWEL N/A 2022    Procedure: with small bowel random biopsy and gastric random biopsy;  Surgeon: Ilan Robertson MD;  Location: [facility] ENDOSCOPY;  Service: Gastroenterology;  Laterality: N/A;  biopsys       Family History   Problem Relation Age of Onset   • No Known Problems Mother        Social History     Socioeconomic History   • Marital status:    Tobacco Use   • Smoking status: Former     Packs/day: 2.00     Years: 21.00     Pack years: 42.00     Types: Cigarettes     Start date:      Quit date: 2019     Years since quittin.2   • Smokeless tobacco: Never   Substance and Sexual Activity   • Alcohol use: No   • Drug use: No   • Sexual activity: Yes     Partners: Female           Objective   Physical Exam  Vitals and nursing note reviewed.   Constitutional:       Appearance: Normal appearance.   HENT:      Head: Normocephalic and atraumatic.   Genitourinary:     Rectum: External hemorrhoid present.      Comments: Chaperone with JULIANN Carrillo.  Patient has large, nonthrombosed hemorrhoid noted.  No bleeding noted.  Neurological:      Mental  "Status: He is alert.         Procedures           ED Course      /92 (BP Location: Left arm, Patient Position: Standing)   Pulse 91   Temp 99 °F (37.2 °C) (Temporal)   Resp 16   Ht 172.7 cm (68\")   Wt 71.9 kg (158 lb 8.2 oz)   SpO2 99%   BMI 24.10 kg/m²   Labs Reviewed - No data to display  Medications   HYDROcodone-acetaminophen (NORCO) 5-325 MG per tablet 1 tablet (1 tablet Oral Given 4/19/23 0229)   hydrocortisone (ANUSOL-HC) suppository 25 mg (25 mg Rectal Given 4/19/23 0229)     No radiology results for the last day                                     Patient INSPECT report queried and reviewed.  I discussed with the patient the risk and benefits associated with opiate/narcotic pain medication today.  Based on patient's exam findings and assessment, I do feel it appropriate to prescribe a short course of opiate/ narcotic pain medication from the emergency department.  The patient was advised of the risks associated with such medication, including risk of dependency.  Patient was advised of medication administration instructions, appropriate use, potential side effects and adverse reactions.  Acknowledged and verbalized understanding, and agrees to treatment.  MDM  Patient is a 42-year-old male presents the ED with complaint of a hemorrhoid.  Patient reports has a history of hemorrhoids in the past he is required incision of them previously.  Patient reports significant pain associate with the same morning.  Differential diagnoses considered: Thrombosed hemorrhoid, abscess.  On exam he does have a large nonthrombosed hemorrhoid.  Patient given Norco, as well as Anusol here.  I will place him on a short course of pain medication for home, as well as Anusol, and given him general surgery follow-up, he does see gastroenterology and is an established patient there.  I spoke with the patient at the bedside regarding their plan of care, discharge instruction,  prescriptions, as well as reasons to return " to the emergency department.  We discussed test results at the bedside, including incidental abnormal labs, radiological findings, understands need and importance  for follow-up with primary care or specialist if indicated.  Patient verbalizes understanding and agrees to the treatment plan at this time.   Pt is aware that discharge does not mean that nothing is wrong but it indicates no emergency is present and they must continue care with follow-up as given below or physician of their choice.    Final diagnoses:   Rectal pain   Acute hemorrhoid       ED Disposition  ED Disposition     ED Disposition   Discharge    Condition   Stable    Comment   --             Joseph Fitzgerald MD  [facility] [address] [place] [id number] [phone]    Schedule an appointment as soon as possible for a visit       Kisha Linares, JASEN  [address] [facility] [place] [id number] [phone]               Medication List      New Prescriptions    HYDROcodone-acetaminophen 5-325 MG per tablet  Commonly known as: NORCO  Take 1 tablet by mouth Every 6 (Six) Hours As Needed for Moderate Pain.     hydrocortisone 25 MG suppository  Commonly known as: ANUSOL-HC  Insert 1 suppository into the rectum 2 (Two) Times a Day As Needed for Hemorrhoids.           Where to Get Your Medications      These medications were sent to [facility] [address] [place] [address] [place] - [address] [facility] [phone] [place] [phone]   [address] [facility] [place] [address]    Phone: [phone]   · HYDROcodone-acetaminophen 5-325 MG per tablet  · hydrocortisone 25 MG suppository         Marguerite Tello, JASEN  04/19/23 0357

## 2023-04-19 NOTE — DISCHARGE INSTRUCTIONS
Please follow-up with your primary care provider, call for an appointment  Please follow-up with general surgery or you may see your gastroenterologist for evaluation of hemorrhoids  Return to ED for new or worsening symptoms

## 2023-04-27 ENCOUNTER — OFFICE VISIT (OUTPATIENT)
Dept: SURGERY | Facility: CLINIC | Age: 43
End: 2023-04-27
Payer: COMMERCIAL

## 2023-04-27 VITALS
RESPIRATION RATE: 18 BRPM | HEART RATE: 62 BPM | DIASTOLIC BLOOD PRESSURE: 75 MMHG | BODY MASS INDEX: 23.64 KG/M2 | HEIGHT: 68 IN | OXYGEN SATURATION: 100 % | WEIGHT: 156 LBS | SYSTOLIC BLOOD PRESSURE: 116 MMHG | TEMPERATURE: 98.4 F

## 2023-04-27 DIAGNOSIS — K64.8 OTHER HEMORRHOIDS: Primary | ICD-10-CM

## 2023-04-27 NOTE — PROGRESS NOTES
"Chief Complaint  Hemorrhoids (NP Ref Marguerite Tello NP, Rectal pain/hemorrhoids )    Subjective        Gerardo Hyatt presents to [facility] GENERAL SURGERY  History of Present Illness    42-year-old gentleman with history of chronic abdominal pain, admitted last year for possible bowel obstruction, small bowel follow-through without high-grade obstruction.  He underwent colonoscopy and EGD with enteroscopy with no abnormalities found.  Since then he has changed his diet avoiding red meat and his pain is resolved.  He has history of hemorrhoids.  5 years ago he had a thrombosed hemorrhoid that required drainage in the emergency department, since then has been doing well but earlier this month he had recurrent thrombosed hemorrhoid.  He presented to the emergency department was prescribed pain medication and topical cream.  He has been doing sitz bath's and his pain is now resolved.  He has some bright red blood with bowel movements, no pain without bowel movements.  He avoid spending long time on the toilet and avoid constipation.  He takes sitz bath's and uses a cream when he has a flareup.    Objective   Vital Signs:  /75 (BP Location: Left arm, Patient Position: Sitting, Cuff Size: Adult)   Pulse 62   Temp 98.4 °F (36.9 °C) (Infrared)   Resp 18   Ht 172.7 cm (68\")   Wt 70.8 kg (156 lb)   SpO2 100%   BMI 23.72 kg/m²   Estimated body mass index is 23.72 kg/m² as calculated from the following:    Height as of this encounter: 172.7 cm (68\").    Weight as of this encounter: 70.8 kg (156 lb).       BMI is within normal parameters. No other follow-up for BMI required.      Physical Exam  Constitutional:       General: He is not in acute distress.     Appearance: Normal appearance. He is not ill-appearing.   HENT:      Head: Normocephalic and atraumatic.      Right Ear: External ear normal.      Left Ear: External ear normal.   Eyes:      Extraocular Movements: Extraocular movements " intact.      Conjunctiva/sclera: Conjunctivae normal.   Cardiovascular:      Rate and Rhythm: Normal rate and regular rhythm.   Pulmonary:      Effort: Pulmonary effort is normal. No respiratory distress.   Abdominal:      General: There is no distension.      Palpations: Abdomen is soft.      Tenderness: There is no abdominal tenderness.   Genitourinary:     Comments: Large external hemorrhoid, nonthrombosed, no masses  Musculoskeletal:         General: No swelling or deformity.   Skin:     General: Skin is warm and dry.   Neurological:      Mental Status: He is alert and oriented to person, place, and time. Mental status is at baseline.        Result Review :                   Assessment and Plan   Diagnoses and all orders for this visit:    1. Other hemorrhoids (Primary)      42-year-old gentleman with history of chronic abdominal pain, admitted last year for possible bowel obstruction, small bowel follow-through without high-grade obstruction.  He underwent colonoscopy and EGD with enteroscopy with no abnormalities found.  Since then he has changed his diet avoiding red meat and his pain is resolved.  He has history of hemorrhoids.  5 years ago he had a thrombosed hemorrhoid that required drainage in the emergency department, since then has been doing well but earlier this month he had recurrent thrombosed hemorrhoid.  He presented to the emergency department was prescribed pain medication and topical cream.  He has been doing sitz bath's and his pain is now resolved.  He has some bright red blood with bowel movements, no pain without bowel movements.  He avoid spending long time on the toilet and avoid constipation.  He takes sitz bath's and uses a cream when he has a flareup.  Discussed options for excisional hemorrhoidectomy versus continue medical management.  Since patient is only had 2 major flares in the past 5 years he would like to continue medical management.  He can follow-up me again in the future if  he would like to discuss surgery again.         Follow Up   No follow-ups on file.  Patient was given instructions and counseling regarding his condition or for health maintenance advice. Please see specific information pulled into the AVS if appropriate.

## 2024-02-21 ENCOUNTER — HOSPITAL ENCOUNTER (OUTPATIENT)
Dept: GENERAL RADIOLOGY | Facility: HOSPITAL | Age: 44
Discharge: HOME | End: 2024-02-21
Admitting: REGISTERED NURSE
Payer: COMMERCIAL

## 2024-02-21 ENCOUNTER — OFFICE VISIT (OUTPATIENT)
Dept: FAMILY MEDICINE CLINIC | Facility: CLINIC | Age: 44
End: 2024-02-21
Payer: COMMERCIAL

## 2024-02-21 ENCOUNTER — PATIENT ROUNDING (BHMG ONLY) (OUTPATIENT)
Dept: FAMILY MEDICINE CLINIC | Facility: CLINIC | Age: 44
End: 2024-02-21
Payer: COMMERCIAL

## 2024-02-21 VITALS
SYSTOLIC BLOOD PRESSURE: 113 MMHG | DIASTOLIC BLOOD PRESSURE: 75 MMHG | BODY MASS INDEX: 24.25 KG/M2 | HEIGHT: 68 IN | WEIGHT: 160 LBS | OXYGEN SATURATION: 99 % | HEART RATE: 61 BPM | TEMPERATURE: 98.2 F

## 2024-02-21 DIAGNOSIS — M25.512 CHRONIC LEFT SHOULDER PAIN: ICD-10-CM

## 2024-02-21 DIAGNOSIS — Z76.89 ENCOUNTER TO ESTABLISH CARE: ICD-10-CM

## 2024-02-21 DIAGNOSIS — G89.29 CHRONIC LEFT SHOULDER PAIN: ICD-10-CM

## 2024-02-21 DIAGNOSIS — Z12.5 SCREENING FOR MALIGNANT NEOPLASM OF PROSTATE: ICD-10-CM

## 2024-02-21 DIAGNOSIS — Z13.29 SCREENING FOR ENDOCRINE, METABOLIC AND IMMUNITY DISORDER: ICD-10-CM

## 2024-02-21 DIAGNOSIS — Z13.228 SCREENING FOR ENDOCRINE, METABOLIC AND IMMUNITY DISORDER: ICD-10-CM

## 2024-02-21 DIAGNOSIS — E78.2 MIXED HYPERLIPIDEMIA: Primary | ICD-10-CM

## 2024-02-21 DIAGNOSIS — F17.290 OTHER TOBACCO PRODUCT NICOTINE DEPENDENCE, UNCOMPLICATED: ICD-10-CM

## 2024-02-21 DIAGNOSIS — Z13.220 SCREENING FOR LIPID DISORDERS: ICD-10-CM

## 2024-02-21 DIAGNOSIS — Z13.1 SCREENING FOR DIABETES MELLITUS: ICD-10-CM

## 2024-02-21 DIAGNOSIS — Z13.29 SCREENING FOR THYROID DISORDER: ICD-10-CM

## 2024-02-21 DIAGNOSIS — Z13.0 SCREENING FOR ENDOCRINE, METABOLIC AND IMMUNITY DISORDER: ICD-10-CM

## 2024-02-21 LAB
BASOPHILS # BLD AUTO: 0.08 10*3/MM3 (ref 0–0.2)
BASOPHILS NFR BLD AUTO: 1.1 % (ref 0–1.5)
DEPRECATED RDW RBC AUTO: 40.3 FL (ref 37–54)
EOSINOPHIL # BLD AUTO: 0.41 10*3/MM3 (ref 0–0.4)
EOSINOPHIL NFR BLD AUTO: 5.8 % (ref 0.3–6.2)
ERYTHROCYTE [DISTWIDTH] IN BLOOD BY AUTOMATED COUNT: 13.2 % (ref 12.3–15.4)
HCT VFR BLD AUTO: 43.9 % (ref 37.5–51)
HGB BLD-MCNC: 15.4 G/DL (ref 13–17.7)
IMM GRANULOCYTES # BLD AUTO: 0.02 10*3/MM3 (ref 0–0.05)
IMM GRANULOCYTES NFR BLD AUTO: 0.3 % (ref 0–0.5)
LYMPHOCYTES # BLD AUTO: 2 10*3/MM3 (ref 0.7–3.1)
LYMPHOCYTES NFR BLD AUTO: 28.3 % (ref 19.6–45.3)
MCH RBC QN AUTO: 29.4 PG (ref 26.6–33)
MCHC RBC AUTO-ENTMCNC: 35.1 G/DL (ref 31.5–35.7)
MCV RBC AUTO: 83.8 FL (ref 79–97)
MONOCYTES # BLD AUTO: 0.57 10*3/MM3 (ref 0.1–0.9)
MONOCYTES NFR BLD AUTO: 8.1 % (ref 5–12)
NEUTROPHILS NFR BLD AUTO: 3.99 10*3/MM3 (ref 1.7–7)
NEUTROPHILS NFR BLD AUTO: 56.4 % (ref 42.7–76)
NRBC BLD AUTO-RTO: 0 /100 WBC (ref 0–0.2)
PLATELET # BLD AUTO: 308 10*3/MM3 (ref 140–450)
PMV BLD AUTO: 10.3 FL (ref 6–12)
RBC # BLD AUTO: 5.24 10*6/MM3 (ref 4.14–5.8)
WBC NRBC COR # BLD AUTO: 7.07 10*3/MM3 (ref 3.4–10.8)

## 2024-02-21 PROCEDURE — 83036 HEMOGLOBIN GLYCOSYLATED A1C: CPT | Performed by: REGISTERED NURSE

## 2024-02-21 PROCEDURE — 80053 COMPREHEN METABOLIC PANEL: CPT | Performed by: REGISTERED NURSE

## 2024-02-21 PROCEDURE — 73030 X-RAY EXAM OF SHOULDER: CPT

## 2024-02-21 PROCEDURE — G0480 DRUG TEST DEF 1-7 CLASSES: HCPCS | Performed by: REGISTERED NURSE

## 2024-02-21 PROCEDURE — 80061 LIPID PANEL: CPT | Performed by: REGISTERED NURSE

## 2024-02-21 PROCEDURE — 85025 COMPLETE CBC W/AUTO DIFF WBC: CPT | Performed by: REGISTERED NURSE

## 2024-02-21 PROCEDURE — G0103 PSA SCREENING: HCPCS | Performed by: REGISTERED NURSE

## 2024-02-21 PROCEDURE — 84443 ASSAY THYROID STIM HORMONE: CPT | Performed by: REGISTERED NURSE

## 2024-02-21 NOTE — PROGRESS NOTES
Chief Complaint  Consult (Here to get established    c/o having left shoulder pain   )    Subjective    History of Present Illness {CC  Problem List  Visit  Diagnosis   Encounters  Notes  Medications  Labs  Result Review Imaging  Media :23}     Gerardo Hyatt presents to [facility] for Consult (Here to get established    c/o having left shoulder pain   ).      History of Present Illness  Patient is a 43 y.o. male  presents to the clinic today for establishment of care with concerns of chronic hyperlipidemia and chronic worsening left shoulder pain x 3 years.  Patient denies any chest pain, shortness of breath, or any fevers.  Patient denies any known exposure to COVID, flu, or any other contagious illnesses.    In regards to hyperlipidemia, patient is trying to manage this through a low-fat diet.  Patient denies any concerns in regards to his hyperlipidemia at this time.    In regards to left shoulder pain, patient shares that he has been experiencing left shoulder pain x 3 years or more.  Patient shares that is continue to worsen.  Patient shares that he had a traumatic injury of his shoulder about 3 years ago and had has not healed well since then.  Patient would like to pursue further imaging at this time.             Review of Systems   Constitutional: Negative.  Negative for activity change, chills, fatigue and fever.   HENT: Negative.  Negative for congestion, dental problem, ear pain, hearing loss, rhinorrhea, sinus pain, sore throat, tinnitus and trouble swallowing.    Eyes: Negative.  Negative for pain and visual disturbance.   Respiratory: Negative.  Negative for cough, chest tightness, shortness of breath and wheezing.    Cardiovascular: Negative.  Negative for chest pain, palpitations and leg swelling.   Gastrointestinal: Negative.  Negative for abdominal pain, diarrhea, nausea and vomiting.   Endocrine: Negative.  Negative for polydipsia, polyphagia and  "polyuria.   Genitourinary: Negative.  Negative for difficulty urinating, dysuria, frequency and urgency.   Musculoskeletal:  Positive for arthralgias. Negative for back pain and myalgias.   Skin: Negative.  Negative for color change, pallor, rash and wound.   Allergic/Immunologic: Negative.  Negative for environmental allergies.   Neurological: Negative.  Negative for dizziness, speech difficulty, weakness, light-headedness, numbness and headaches.   Hematological: Negative.    Psychiatric/Behavioral: Negative.  Negative for confusion, decreased concentration, self-injury and suicidal ideas. The patient is not nervous/anxious.    All other systems reviewed and are negative.       Objective     Vital Signs:   /75 (BP Location: Right arm, Patient Position: Sitting, Cuff Size: Adult)   Pulse 61   Temp 98.2 °F (36.8 °C) (Infrared)   Ht 172.7 cm (68\")   Wt 72.6 kg (160 lb)   SpO2 99%   BMI 24.33 kg/m²   Current Outpatient Medications on File Prior to Visit   Medication Sig Dispense Refill    acetaminophen (TYLENOL) 500 MG tablet Take 1 tablet by mouth 2 (Two) Times a Day As Needed for Mild Pain, Moderate Pain, Headache or Fever.      omeprazole (priLOSEC) 20 MG capsule Take 1 capsule by mouth Daily As Needed.      polyethylene glycol (MIRALAX) 17 g packet Take 17 g by mouth Daily As Needed (Use if senna-docusate is ineffective).       No current facility-administered medications on file prior to visit.        Past Medical History:   Diagnosis Date    GERD (gastroesophageal reflux disease)     Rectal bleeding 2020      Past Surgical History:   Procedure Laterality Date    COLONOSCOPY      ENTEROSCOPY SMALL BOWEL N/A 06/20/2022    Procedure: with small bowel random biopsy and gastric random biopsy;  Surgeon: Ilan Robertson MD;  Location: [facility];  Service: Gastroenterology;  Laterality: N/A;  biopsys    HEMORRHOIDECTOMY  Dont remember    Its been at least over 5 years ago.      Family History "   Problem Relation Age of Onset    No Known Problems Mother     No Known Problems Father       Social History     Socioeconomic History    Marital status:    Tobacco Use    Smoking status: Former     Current packs/day: 0.00     Average packs/day: 1.5 packs/day for 21.0 years (31.5 ttl pk-yrs)     Types: Cigarettes     Start date: 1998     Quit date: 2019     Years since quittin.2     Passive exposure: Past    Smokeless tobacco: Never   Substance and Sexual Activity    Alcohol use: No    Drug use: No    Sexual activity: Yes     Partners: Female     Birth control/protection: None         Office Visit on 2024   Component Date Value Ref Range Status    Nicotine 2024 36.3  ng/mL Final    This test was developed and its performance characteristics  determined by [employer]. It has not been cleared or approved  by the Food and Drug Administration.  Nicotine levels greater than 2.0 are consistent with the use of  tobacco or tobacco cessation products.    Cotinine 2024 294.0  ng/mL Final    This test was developed and its performance characteristics  determined by [employer]. It has not been cleared or approved  by the Food and Drug Administration.  Cotinine levels greater than 20.0 are consistent with the use of  tobacco or tobacco cessation products.    Glucose 2024 85  65 - 99 mg/dL Final    BUN 2024 12  6 - 20 mg/dL Final    Creatinine 2024 0.88  0.76 - 1.27 mg/dL Final    Sodium 2024 138  136 - 145 mmol/L Final    Potassium 2024 4.4  3.5 - 5.2 mmol/L Final    Chloride 2024 101  98 - 107 mmol/L Final    CO2 2024 25.7  22.0 - 29.0 mmol/L Final    Calcium 2024 9.5  8.6 - 10.5 mg/dL Final    Total Protein 2024 7.4  6.0 - 8.5 g/dL Final    Albumin 2024 4.8  3.5 - 5.2 g/dL Final    ALT (SGPT) 2024 23  1 - 41 U/L Final    AST (SGOT) 2024 22  1 - 40 U/L Final    Alkaline Phosphatase 2024 66  39 - 117 U/L Final    Total  Bilirubin 02/21/2024 0.6  0.0 - 1.2 mg/dL Final    Globulin 02/21/2024 2.6  gm/dL Final    A/G Ratio 02/21/2024 1.8  g/dL Final    BUN/Creatinine Ratio 02/21/2024 13.6  7.0 - 25.0 Final    Anion Gap 02/21/2024 11.3  5.0 - 15.0 mmol/L Final    eGFR 02/21/2024 110.1  >60.0 mL/min/1.73 Final    Hemoglobin A1C 02/21/2024 5.30  4.80 - 5.60 % Final    Total Cholesterol 02/21/2024 239 (H)  0 - 200 mg/dL Final    Triglycerides 02/21/2024 100  0 - 150 mg/dL Final    HDL Cholesterol 02/21/2024 40  40 - 60 mg/dL Final    LDL Cholesterol  02/21/2024 181 (H)  0 - 100 mg/dL Final    VLDL Cholesterol 02/21/2024 18  5 - 40 mg/dL Final    LDL/HDL Ratio 02/21/2024 4.48   Final    TSH 02/21/2024 0.951  0.270 - 4.200 uIU/mL Final    PSA 02/21/2024 0.419  0.000 - 4.000 ng/mL Final    WBC 02/21/2024 7.07  3.40 - 10.80 10*3/mm3 Final    RBC 02/21/2024 5.24  4.14 - 5.80 10*6/mm3 Final    Hemoglobin 02/21/2024 15.4  13.0 - 17.7 g/dL Final    Hematocrit 02/21/2024 43.9  37.5 - 51.0 % Final    MCV 02/21/2024 83.8  79.0 - 97.0 fL Final    MCH 02/21/2024 29.4  26.6 - 33.0 pg Final    MCHC 02/21/2024 35.1  31.5 - 35.7 g/dL Final    RDW 02/21/2024 13.2  12.3 - 15.4 % Final    RDW-SD 02/21/2024 40.3  37.0 - 54.0 fl Final    MPV 02/21/2024 10.3  6.0 - 12.0 fL Final    Platelets 02/21/2024 308  140 - 450 10*3/mm3 Final    Neutrophil % 02/21/2024 56.4  42.7 - 76.0 % Final    Lymphocyte % 02/21/2024 28.3  19.6 - 45.3 % Final    Monocyte % 02/21/2024 8.1  5.0 - 12.0 % Final    Eosinophil % 02/21/2024 5.8  0.3 - 6.2 % Final    Basophil % 02/21/2024 1.1  0.0 - 1.5 % Final    Immature Grans % 02/21/2024 0.3  0.0 - 0.5 % Final    Neutrophils, Absolute 02/21/2024 3.99  1.70 - 7.00 10*3/mm3 Final    Lymphocytes, Absolute 02/21/2024 2.00  0.70 - 3.10 10*3/mm3 Final    Monocytes, Absolute 02/21/2024 0.57  0.10 - 0.90 10*3/mm3 Final    Eosinophils, Absolute 02/21/2024 0.41 (H)  0.00 - 0.40 10*3/mm3 Final    Basophils, Absolute 02/21/2024 0.08  0.00 - 0.20  10*3/mm3 Final    Immature Grans, Absolute 02/21/2024 0.02  0.00 - 0.05 10*3/mm3 Final    nRBC 02/21/2024 0.0  0.0 - 0.2 /100 WBC Final         Physical Exam  Vitals and nursing note reviewed.   Constitutional:       Appearance: Normal appearance. He is normal weight.   HENT:      Head: Normocephalic and atraumatic.   Cardiovascular:      Rate and Rhythm: Normal rate and regular rhythm.      Pulses: Normal pulses.      Heart sounds: Normal heart sounds. No murmur heard.     No friction rub. No gallop.   Pulmonary:      Effort: Pulmonary effort is normal. No respiratory distress.      Breath sounds: Normal breath sounds. No stridor. No wheezing, rhonchi or rales.   Chest:      Chest wall: No tenderness.   Abdominal:      General: Abdomen is flat. Bowel sounds are normal. There is no distension.      Palpations: Abdomen is soft. There is no mass.      Tenderness: There is no abdominal tenderness. There is no right CVA tenderness, left CVA tenderness, guarding or rebound.      Hernia: No hernia is present.   Musculoskeletal:      Left shoulder: Decreased range of motion.   Skin:     General: Skin is warm and dry.      Capillary Refill: Capillary refill takes less than 2 seconds.      Coloration: Skin is not jaundiced or pale.   Neurological:      General: No focal deficit present.      Mental Status: He is alert and oriented to person, place, and time. Mental status is at baseline.      Motor: No weakness.      Coordination: Coordination normal.      Gait: Gait normal.   Psychiatric:         Mood and Affect: Mood normal.         Behavior: Behavior normal.         Thought Content: Thought content normal.         Judgment: Judgment normal.          Result Review  Data Reviewed:{ Labs  Result Review  Imaging  Med Tab  Media :23}   I have reviewed this patient's chart.  I have reviewed previous labs, previous imaging, previous medications, and previous encounters with notes that were available in this patient's  chart.               Assessment and Plan {CC Problem List  Visit Diagnosis  ROS  Review (Popup)  [facility]  Quality  BestPractice  Medications  SmartSets  SnapShot Encounters  Media :23}   Diagnoses and all orders for this visit:    1. Mixed hyperlipidemia (Primary)    2. Encounter to establish care    3. Screening for endocrine, metabolic and immunity disorder  -     CBC & Differential  -     Comprehensive Metabolic Panel    4. Screening for diabetes mellitus  -     Hemoglobin A1c    5. Screening for thyroid disorder  -     TSH    6. Screening for lipid disorders  -     Lipid Panel    7. Screening for malignant neoplasm of prostate  -     PSA Screen    8. Other tobacco product nicotine dependence, uncomplicated  -     Nicotine & Metabolite, Quant    9. Chronic left shoulder pain  -     XR Shoulder 2+ View Left; Future  -     MRI Shoulder Left Without Contrast; Future        -Fasting labs today.  -Filled out paperwork for patient's work for health screening.  -X-ray of left shoulder to rule out abnormalities.  -MRI to further investigate continued pain of left shoulder.  -ER red flags discussed with patient including risk versus benefit and education provided.  -Follow-up with me in 3 months or sooner if needed.    I spent 45 minutes caring for Gerardo on this date of service. This time includes time spent by me in the following activities:preparing for the visit, reviewing tests, obtaining and/or reviewing a separately obtained history, performing a medically appropriate examination and/or evaluation , counseling and educating the patient/family/caregiver, ordering medications, tests, or procedures, referring and communicating with other health care professionals , documenting information in the medical record, independently interpreting results and communicating that information with the patient/family/caregiver, and care coordination.    Follow Up {Instructions Charge Capture  Follow-up  Communications :23}     Patient was given instructions and counseling regarding his condition or for health maintenance advice. Please see specific information pulled into the AVS (placed there by myself) if appropriate.    Return in about 3 months (around 5/21/2024) for routine follow up.    BMI is within normal parameters. No other follow-up for BMI required.       JASEN Brown, FNP-BC

## 2024-02-21 NOTE — PROGRESS NOTES
February 21, 2024    Hello, may I speak with Gerardo Hyatt?    My name is RICK      I am  with [facility]  [address] [place] [address].    Before we get started may I verify your date of birth? 1980    I am calling to officially welcome you to our practice and ask about your recent visit. Is this a good time to talk? yes    Tell me about your visit with us. What things went well?  EVERYTHING WAS GREAT VERY NICE PERSON AND STAFF       We're always looking for ways to make our patients' experiences even better. Do you have recommendations on ways we may improve?  no    Overall were you satisfied with your first visit to our practice? yes       I appreciate you taking the time to speak with me today. Is there anything else I can do for you? no      Thank you, and have a great day.

## 2024-02-22 LAB
ALBUMIN SERPL-MCNC: 4.8 G/DL (ref 3.5–5.2)
ALBUMIN/GLOB SERPL: 1.8 G/DL
ALP SERPL-CCNC: 66 U/L (ref 39–117)
ALT SERPL W P-5'-P-CCNC: 23 U/L (ref 1–41)
ANION GAP SERPL CALCULATED.3IONS-SCNC: 11.3 MMOL/L (ref 5–15)
AST SERPL-CCNC: 22 U/L (ref 1–40)
BILIRUB SERPL-MCNC: 0.6 MG/DL (ref 0–1.2)
BUN SERPL-MCNC: 12 MG/DL (ref 6–20)
BUN/CREAT SERPL: 13.6 (ref 7–25)
CALCIUM SPEC-SCNC: 9.5 MG/DL (ref 8.6–10.5)
CHLORIDE SERPL-SCNC: 101 MMOL/L (ref 98–107)
CHOLEST SERPL-MCNC: 239 MG/DL (ref 0–200)
CO2 SERPL-SCNC: 25.7 MMOL/L (ref 22–29)
CREAT SERPL-MCNC: 0.88 MG/DL (ref 0.76–1.27)
EGFRCR SERPLBLD CKD-EPI 2021: 110.1 ML/MIN/1.73
GLOBULIN UR ELPH-MCNC: 2.6 GM/DL
GLUCOSE SERPL-MCNC: 85 MG/DL (ref 65–99)
HBA1C MFR BLD: 5.3 % (ref 4.8–5.6)
HDLC SERPL-MCNC: 40 MG/DL (ref 40–60)
LDLC SERPL CALC-MCNC: 181 MG/DL (ref 0–100)
LDLC/HDLC SERPL: 4.48 {RATIO}
POTASSIUM SERPL-SCNC: 4.4 MMOL/L (ref 3.5–5.2)
PROT SERPL-MCNC: 7.4 G/DL (ref 6–8.5)
PSA SERPL-MCNC: 0.42 NG/ML (ref 0–4)
SODIUM SERPL-SCNC: 138 MMOL/L (ref 136–145)
TRIGL SERPL-MCNC: 100 MG/DL (ref 0–150)
TSH SERPL DL<=0.05 MIU/L-ACNC: 0.95 UIU/ML (ref 0.27–4.2)
VLDLC SERPL-MCNC: 18 MG/DL (ref 5–40)

## 2024-02-28 LAB
COTININE SERPL-MCNC: 294 NG/ML
NICOTINE SERPL-MCNC: 36.3 NG/ML

## 2024-02-29 ENCOUNTER — TELEPHONE (OUTPATIENT)
Dept: FAMILY MEDICINE CLINIC | Facility: CLINIC | Age: 44
End: 2024-02-29
Payer: COMMERCIAL

## 2024-02-29 NOTE — TELEPHONE ENCOUNTER
HUB to read description below.    IF PT CALLS BACK HE WILL HAVE TO CALL [facility] [phone] TO SCHEDULE MRI. THANK YOU

## 2024-03-18 ENCOUNTER — TELEPHONE (OUTPATIENT)
Dept: FAMILY MEDICINE CLINIC | Facility: CLINIC | Age: 44
End: 2024-03-18

## 2024-03-18 NOTE — TELEPHONE ENCOUNTER
Caller: Gerardo Hyatt    Relationship: Self    Best call back number: [phone]     What was the call regarding: PATIENT REQUESTS A CALL BACK TO FOLLOW UP ON STATUS OF PAPERWORK HE LEFT DURING HIS APPOINTMENT ON 2/21/24

## 2024-09-30 ENCOUNTER — APPOINTMENT (OUTPATIENT)
Dept: CT IMAGING | Facility: HOSPITAL | Age: 44
End: 2024-09-30
Payer: COMMERCIAL

## 2024-09-30 ENCOUNTER — HOSPITAL ENCOUNTER (EMERGENCY)
Facility: HOSPITAL | Age: 44
Discharge: HOME | End: 2024-09-30
Admitting: EMERGENCY MEDICINE
Payer: COMMERCIAL

## 2024-09-30 VITALS
BODY MASS INDEX: 25.26 KG/M2 | DIASTOLIC BLOOD PRESSURE: 74 MMHG | RESPIRATION RATE: 18 BRPM | SYSTOLIC BLOOD PRESSURE: 108 MMHG | HEART RATE: 66 BPM | WEIGHT: 166.67 LBS | TEMPERATURE: 97.6 F | HEIGHT: 68 IN | OXYGEN SATURATION: 97 %

## 2024-09-30 DIAGNOSIS — R10.84 GENERALIZED ABDOMINAL PAIN: ICD-10-CM

## 2024-09-30 DIAGNOSIS — R11.2 NAUSEA AND VOMITING, UNSPECIFIED VOMITING TYPE: ICD-10-CM

## 2024-09-30 DIAGNOSIS — D72.829 LEUKOCYTOSIS, UNSPECIFIED TYPE: Primary | ICD-10-CM

## 2024-09-30 LAB
ALBUMIN SERPL-MCNC: 4.7 G/DL (ref 3.5–5.2)
ALBUMIN/GLOB SERPL: 1.9 G/DL
ALP SERPL-CCNC: 76 U/L (ref 39–117)
ALT SERPL W P-5'-P-CCNC: 32 U/L (ref 1–41)
ANION GAP SERPL CALCULATED.3IONS-SCNC: 11.2 MMOL/L (ref 5–15)
AST SERPL-CCNC: 27 U/L (ref 1–40)
BACTERIA UR QL AUTO: NORMAL /HPF
BASOPHILS # BLD AUTO: 0.04 10*3/MM3 (ref 0–0.2)
BASOPHILS NFR BLD AUTO: 0.3 % (ref 0–1.5)
BILIRUB SERPL-MCNC: 0.6 MG/DL (ref 0–1.2)
BILIRUB UR QL STRIP: NEGATIVE
BUN SERPL-MCNC: 17 MG/DL (ref 6–20)
BUN/CREAT SERPL: 19.8 (ref 7–25)
CALCIUM SPEC-SCNC: 9.4 MG/DL (ref 8.6–10.5)
CHLORIDE SERPL-SCNC: 104 MMOL/L (ref 98–107)
CLARITY UR: CLEAR
CO2 SERPL-SCNC: 21.8 MMOL/L (ref 22–29)
COLOR UR: YELLOW
CREAT SERPL-MCNC: 0.86 MG/DL (ref 0.76–1.27)
DEPRECATED RDW RBC AUTO: 40.2 FL (ref 37–54)
EGFRCR SERPLBLD CKD-EPI 2021: 110.2 ML/MIN/1.73
EOSINOPHIL # BLD AUTO: 0.3 10*3/MM3 (ref 0–0.4)
EOSINOPHIL NFR BLD AUTO: 2.5 % (ref 0.3–6.2)
ERYTHROCYTE [DISTWIDTH] IN BLOOD BY AUTOMATED COUNT: 13 % (ref 12.3–15.4)
GLOBULIN UR ELPH-MCNC: 2.5 GM/DL
GLUCOSE SERPL-MCNC: 105 MG/DL (ref 65–99)
GLUCOSE UR STRIP-MCNC: NEGATIVE MG/DL
HCT VFR BLD AUTO: 43.6 % (ref 37.5–51)
HGB BLD-MCNC: 15.2 G/DL (ref 13–17.7)
HGB UR QL STRIP.AUTO: NEGATIVE
HOLD SPECIMEN: NORMAL
HYALINE CASTS UR QL AUTO: NORMAL /LPF
IMM GRANULOCYTES # BLD AUTO: 0.05 10*3/MM3 (ref 0–0.05)
IMM GRANULOCYTES NFR BLD AUTO: 0.4 % (ref 0–0.5)
KETONES UR QL STRIP: ABNORMAL
LEUKOCYTE ESTERASE UR QL STRIP.AUTO: ABNORMAL
LIPASE SERPL-CCNC: 28 U/L (ref 13–60)
LYMPHOCYTES # BLD AUTO: 0.91 10*3/MM3 (ref 0.7–3.1)
LYMPHOCYTES NFR BLD AUTO: 7.5 % (ref 19.6–45.3)
MCH RBC QN AUTO: 29.5 PG (ref 26.6–33)
MCHC RBC AUTO-ENTMCNC: 34.9 G/DL (ref 31.5–35.7)
MCV RBC AUTO: 84.5 FL (ref 79–97)
MONOCYTES # BLD AUTO: 0.87 10*3/MM3 (ref 0.1–0.9)
MONOCYTES NFR BLD AUTO: 7.2 % (ref 5–12)
NEUTROPHILS NFR BLD AUTO: 82.1 % (ref 42.7–76)
NEUTROPHILS NFR BLD AUTO: 9.9 10*3/MM3 (ref 1.7–7)
NITRITE UR QL STRIP: NEGATIVE
NRBC BLD AUTO-RTO: 0 /100 WBC (ref 0–0.2)
PH UR STRIP.AUTO: 7.5 [PH] (ref 5–8)
PLATELET # BLD AUTO: 259 10*3/MM3 (ref 140–450)
PMV BLD AUTO: 9.5 FL (ref 6–12)
POTASSIUM SERPL-SCNC: 4.3 MMOL/L (ref 3.5–5.2)
PROT SERPL-MCNC: 7.2 G/DL (ref 6–8.5)
PROT UR QL STRIP: NEGATIVE
RBC # BLD AUTO: 5.16 10*6/MM3 (ref 4.14–5.8)
RBC # UR STRIP: NORMAL /HPF
REF LAB TEST METHOD: NORMAL
SODIUM SERPL-SCNC: 137 MMOL/L (ref 136–145)
SP GR UR STRIP: 1.02 (ref 1–1.03)
SQUAMOUS #/AREA URNS HPF: NORMAL /HPF
UROBILINOGEN UR QL STRIP: ABNORMAL
WBC # UR STRIP: NORMAL /HPF
WBC NRBC COR # BLD AUTO: 12.07 10*3/MM3 (ref 3.4–10.8)

## 2024-09-30 PROCEDURE — 85025 COMPLETE CBC W/AUTO DIFF WBC: CPT

## 2024-09-30 PROCEDURE — 25810000003 SODIUM CHLORIDE 0.9 % SOLUTION

## 2024-09-30 PROCEDURE — 25010000002 ONDANSETRON PER 1 MG

## 2024-09-30 PROCEDURE — 74177 CT ABD & PELVIS W/CONTRAST: CPT

## 2024-09-30 PROCEDURE — 83690 ASSAY OF LIPASE: CPT

## 2024-09-30 PROCEDURE — 80053 COMPREHEN METABOLIC PANEL: CPT

## 2024-09-30 PROCEDURE — 25510000001 IOPAMIDOL PER 1 ML

## 2024-09-30 PROCEDURE — 25010000002 KETOROLAC TROMETHAMINE PER 15 MG

## 2024-09-30 PROCEDURE — 81001 URINALYSIS AUTO W/SCOPE: CPT

## 2024-09-30 PROCEDURE — 96375 TX/PRO/DX INJ NEW DRUG ADDON: CPT

## 2024-09-30 PROCEDURE — 96374 THER/PROPH/DIAG INJ IV PUSH: CPT

## 2024-09-30 PROCEDURE — 99285 EMERGENCY DEPT VISIT HI MDM: CPT

## 2024-09-30 RX ORDER — IOPAMIDOL 755 MG/ML
100 INJECTION, SOLUTION INTRAVASCULAR
Status: COMPLETED | OUTPATIENT
Start: 2024-09-30 | End: 2024-09-30

## 2024-09-30 RX ORDER — KETOROLAC TROMETHAMINE 30 MG/ML
15 INJECTION, SOLUTION INTRAMUSCULAR; INTRAVENOUS ONCE
Status: COMPLETED | OUTPATIENT
Start: 2024-09-30 | End: 2024-09-30

## 2024-09-30 RX ORDER — ONDANSETRON 4 MG/1
4 TABLET, ORALLY DISINTEGRATING ORAL EVERY 8 HOURS PRN
Qty: 21 TABLET | Refills: 0 | Status: SHIPPED | OUTPATIENT
Start: 2024-09-30

## 2024-09-30 RX ORDER — ONDANSETRON 2 MG/ML
4 INJECTION INTRAMUSCULAR; INTRAVENOUS ONCE
Status: COMPLETED | OUTPATIENT
Start: 2024-09-30 | End: 2024-09-30

## 2024-09-30 RX ORDER — SODIUM CHLORIDE 0.9 % (FLUSH) 0.9 %
10 SYRINGE (ML) INJECTION AS NEEDED
Status: DISCONTINUED | OUTPATIENT
Start: 2024-09-30 | End: 2024-09-30 | Stop reason: HOSPADM

## 2024-09-30 RX ADMIN — ONDANSETRON 4 MG: 2 INJECTION, SOLUTION INTRAMUSCULAR; INTRAVENOUS at 08:05

## 2024-09-30 RX ADMIN — IOPAMIDOL 100 ML: 755 INJECTION, SOLUTION INTRAVENOUS at 09:03

## 2024-09-30 RX ADMIN — KETOROLAC TROMETHAMINE 15 MG: 30 INJECTION, SOLUTION INTRAMUSCULAR at 08:14

## 2024-09-30 RX ADMIN — SODIUM CHLORIDE 500 ML: 9 INJECTION, SOLUTION INTRAVENOUS at 08:03

## 2024-09-30 NOTE — ED PROVIDER NOTES
Subjective   History of Present Illness  Patient is a 44-year-old male presenting to the ED for abdominal pain and vomiting over the past 4 hours.  Patient states he woke up this morning with vomiting, abdominal pain, and hot flashes.  He describes the pain as feeling lots of pressure and distention.  He states he feels gassy but cannot pass gas.  He has tried taking Bentyl and Pepto-Bismol but immediately threw them up.  He denies any fever, chills, diarrhea, urinary symptoms, upper respiratory symptoms, or any history of abdominal surgeries.  Patient's last normal bowel movement was yesterday.        Review of Systems   Constitutional:  Positive for diaphoresis. Negative for chills and fever.   HENT:  Negative for congestion.    Respiratory:  Negative for cough.    Gastrointestinal:  Positive for abdominal pain, nausea and vomiting. Negative for constipation and diarrhea.   Genitourinary:  Negative for dysuria, frequency and urgency.       Past Medical History:   Diagnosis Date    GERD (gastroesophageal reflux disease)     Rectal bleeding 2020       No Known Allergies    Past Surgical History:   Procedure Laterality Date    COLONOSCOPY      ENTEROSCOPY SMALL BOWEL N/A 2022    Procedure: with small bowel random biopsy and gastric random biopsy;  Surgeon: Ilan Robertson MD;  [facility];  Service: Gastroenterology;  Laterality: N/A;  biopsys    HEMORRHOIDECTOMY  Dont remember    Its been at least over 5 years ago.       Family History   Problem Relation Age of Onset    No Known Problems Mother     No Known Problems Father        Social History     Socioeconomic History    Marital status:    Tobacco Use    Smoking status: Former     Current packs/day: 0.00     Average packs/day: 1.5 packs/day for 21.0 years (31.5 ttl pk-yrs)     Types: Cigarettes     Start date: 1998     Quit date: 2019     Years since quittin.7     Passive exposure: Past    Smokeless tobacco: Never  "  Substance and Sexual Activity    Alcohol use: No    Drug use: No    Sexual activity: Yes     Partners: Female     Birth control/protection: None           Objective   Physical Exam  Constitutional:       Appearance: Normal appearance.   HENT:      Head: Normocephalic and atraumatic.   Eyes:      Extraocular Movements: Extraocular movements intact.   Cardiovascular:      Rate and Rhythm: Normal rate and regular rhythm.      Pulses: Normal pulses.      Heart sounds: Normal heart sounds.   Pulmonary:      Effort: Pulmonary effort is normal.      Breath sounds: Normal breath sounds.   Abdominal:      General: Bowel sounds are normal. There is distension.      Palpations: Abdomen is soft.      Tenderness: There is generalized abdominal tenderness.      Hernia: No hernia is present.   Musculoskeletal:         General: Normal range of motion.      Cervical back: Normal range of motion.      Right lower leg: No edema.      Left lower leg: No edema.   Skin:     General: Skin is warm and dry.      Capillary Refill: Capillary refill takes less than 2 seconds.   Neurological:      General: No focal deficit present.      Mental Status: He is alert and oriented to person, place, and time.   Psychiatric:         Mood and Affect: Mood normal.         Behavior: Behavior normal.         Procedures           ED Course      /74   Pulse 66   Temp 97.6 °F (36.4 °C) (Oral)   Resp 18   Ht 172.7 cm (68\")   Wt 75.6 kg (166 lb 10.7 oz)   SpO2 97%   BMI 25.34 kg/m²   Labs Reviewed   COMPREHENSIVE METABOLIC PANEL - Abnormal; Notable for the following components:       Result Value    Glucose 105 (*)     CO2 21.8 (*)     All other components within normal limits    Narrative:     GFR Normal >60  Chronic Kidney Disease <60  Kidney Failure <15     URINALYSIS W/ MICROSCOPIC IF INDICATED (NO CULTURE) - Abnormal; Notable for the following components:    Ketones, UA Trace (*)     Leuk Esterase, UA Trace (*)     All other components " within normal limits   CBC WITH AUTO DIFFERENTIAL - Abnormal; Notable for the following components:    WBC 12.07 (*)     Neutrophil % 82.1 (*)     Lymphocyte % 7.5 (*)     Neutrophils, Absolute 9.90 (*)     All other components within normal limits   LIPASE - Normal   URINALYSIS, MICROSCOPIC ONLY   CBC AND DIFFERENTIAL    Narrative:     The following orders were created for panel order CBC & Differential.  Procedure                               Abnormality         Status                     ---------                               -----------         ------                     CBC Auto Differential[[id number]]        Abnormal            Final result                 Please view results for these tests on the individual orders.   EXTRA TUBES    Narrative:     The following orders were created for panel order Extra Tubes.  Procedure                               Abnormality         Status                     ---------                               -----------         ------                     Gold Top - SST[[id number]]                                   Final result                 Please view results for these tests on the individual orders.   GOLD TOP - SST     Medications   sodium chloride 0.9 % bolus 500 mL (0 mL Intravenous Stopped 9/30/24 1024)   ondansetron (ZOFRAN) injection 4 mg (4 mg Intravenous Given 9/30/24 0805)   ketorolac (TORADOL) injection 15 mg (15 mg Intravenous Given 9/30/24 0814)   iopamidol (ISOVUE-370) 76 % injection 100 mL (100 mL Intravenous Given 9/30/24 0903)     CT Abdomen Pelvis With Contrast    Result Date: 9/30/2024  Impression: No acute process identified within abdomen/pelvis. Electronically Signed: Denzel Her MD  9/30/2024 9:20 AM EDT  Workstation ID: [id number]                                          Medical Decision Making  Patient presented to the ED for the above complaint.    Patient underwent the above exam and evaluation.    While in the ED patient was placed in a gown and IV  was established.  Blood work and CT abdomen pelvis was obtained to assess for infection, obstruction, perforation.  Patient was given IV fluids, Toradol and Zofran for symptoms.  On reevaluation patient is resting comfortably, states his pain has improved, no episodes of vomiting while in ED..  Patient was given water as a p.o. challenge and tolerated well.  Discussed findings of CT with patient and wife at bedside.  Patient will be discharged home with Zofran, educated to take at home Bentyl as needed, to follow-up with primary care as needed, and return the ED for any new or worsening symptoms.  Patient and wife agreeable with dispo plan.    Labs were independently interpreted by myself and deemed remarkable for the following: WBC 12.07, glucose 105, lipase 28, UA showed trace leuk esterase, micro unremarkable for infection.  CT abdomen pelvis independently interpreted by radiologist and reviewed by myself showing: No acute findings.    Appropriate PPE was worn during each patient encounter.      Problems Addressed:  Generalized abdominal pain: complicated acute illness or injury  Leukocytosis, unspecified type: complicated acute illness or injury  Nausea and vomiting, unspecified vomiting type: complicated acute illness or injury    Amount and/or Complexity of Data Reviewed  Labs: ordered.  Radiology: ordered.    Risk  Prescription drug management.        Final diagnoses:   Leukocytosis, unspecified type   Nausea and vomiting, unspecified vomiting type   Generalized abdominal pain       ED Disposition  ED Disposition       ED Disposition   Discharge    Condition   Stable    Comment   --               Baldomero Desai, APRN  [address] [facility] [place] [address] [phone]      As needed         Medication List        New Prescriptions      ondansetron ODT 4 MG disintegrating tablet  Commonly known as: ZOFRAN-ODT  Place 1 tablet on the tongue Every 8 (Eight) Hours As Needed for Nausea or Vomiting.                Where to Get Your Medications        These medications were sent to [facility] [address] [place] [address] [place] - [address] [facility] - [phone]  - [phone]   [address] [facility] [place] [address]      Phone: [phone]   ondansetron ODT 4 MG disintegrating tablet            Esperanza Jones PA-C  09/30/24 7089

## 2024-09-30 NOTE — DISCHARGE INSTRUCTIONS
Use Zofran per package instructions as needed.  Increase fluid intake.  Eat a liquid diet today and increase as tolerated.    Follow-up with primary care as needed.    Return to the ED for any new or worsening symptoms.

## 2025-01-03 ENCOUNTER — OFFICE VISIT (OUTPATIENT)
Dept: FAMILY MEDICINE CLINIC | Facility: CLINIC | Age: 45
End: 2025-01-03
Payer: COMMERCIAL

## 2025-01-03 VITALS
WEIGHT: 163.2 LBS | BODY MASS INDEX: 24.74 KG/M2 | OXYGEN SATURATION: 97 % | HEART RATE: 77 BPM | SYSTOLIC BLOOD PRESSURE: 106 MMHG | HEIGHT: 68 IN | RESPIRATION RATE: 18 BRPM | DIASTOLIC BLOOD PRESSURE: 80 MMHG

## 2025-01-03 DIAGNOSIS — Z23 NEED FOR TDAP VACCINATION: ICD-10-CM

## 2025-01-03 DIAGNOSIS — Z13.1 SCREENING FOR DIABETES MELLITUS: ICD-10-CM

## 2025-01-03 DIAGNOSIS — E78.2 MIXED HYPERLIPIDEMIA: Primary | ICD-10-CM

## 2025-01-03 DIAGNOSIS — Z13.0 SCREENING FOR ENDOCRINE, METABOLIC AND IMMUNITY DISORDER: ICD-10-CM

## 2025-01-03 DIAGNOSIS — K21.9 GASTROESOPHAGEAL REFLUX DISEASE, UNSPECIFIED WHETHER ESOPHAGITIS PRESENT: ICD-10-CM

## 2025-01-03 DIAGNOSIS — Z13.228 SCREENING FOR ENDOCRINE, METABOLIC AND IMMUNITY DISORDER: ICD-10-CM

## 2025-01-03 DIAGNOSIS — F17.200 NICOTINE DEPENDENCE WITH CURRENT USE: ICD-10-CM

## 2025-01-03 DIAGNOSIS — Z13.29 SCREENING FOR ENDOCRINE, METABOLIC AND IMMUNITY DISORDER: ICD-10-CM

## 2025-01-03 DIAGNOSIS — J32.9 CHRONIC CONGESTION OF PARANASAL SINUS: ICD-10-CM

## 2025-01-03 DIAGNOSIS — Z13.29 SCREENING FOR THYROID DISORDER: ICD-10-CM

## 2025-01-03 DIAGNOSIS — T78.40XD ALLERGY, SUBSEQUENT ENCOUNTER: ICD-10-CM

## 2025-01-03 PROCEDURE — 99215 OFFICE O/P EST HI 40 MIN: CPT | Performed by: REGISTERED NURSE

## 2025-01-03 NOTE — PROGRESS NOTES
Chief Complaint  Primary Care Follow-Up and Hyperlipidemia    Subjective    History of Present Illness {CC  Problem List  Visit  Diagnosis   Encounters  Notes  Medications  Labs  Result Review Imaging  Media :23}     Gerardo Hyatt presents to [facility] for Primary Care Follow-Up and Hyperlipidemia.      History of Present Illness  Patient is a 44 y.o. male who presents to the clinic today for 6-month follow-up for hyperlipidemia, GERD, and concerns of chronic worsening headaches for approximately 14 years.  Patient denies any chest pain, shortness of breath, or any fevers.  Patient denies any known exposure to COVID, flu, or any other contagious illnesses.       History of Present Illness  In regards to hyperlipidemia, he has been managing his hyperlipidemia through dietary modifications, specifically by substituting beef with turkey and chicken. He is scheduled for a wellness program at work on 03/31/2025, which includes a nicotine test.  Patient will like to get labs this week to have this wellness form filled out.    In regards to GERD, he has been self-medicating with over-the-counter omeprazole for his GERD. He is open to the possibility of obtaining a prescription for this medication.  He shares that he is only done this because it was more cost effective.  Patient does try to follow a bland diet when possible.  He denies any concerns or issues with his GERD or his omeprazole at this time.    In regards to chronic headaches, he has been experiencing chronic headaches for several years, which are typically managed with Tylenol. However, he reported that his usual analgesics, including Aleve, were ineffective in alleviating the headaches he experienced last Saturday and Sunday. These headaches were severe enough to disrupt his sleep but subsided after he went to bed. The pain is predominantly located at the back of his head, radiating to other areas. The intensity of  the pain varies, sometimes preventing him from sitting or lying down, while at other times, standing provides some relief. The severity of the headaches also induces nausea and dizziness.  Discussed options to investigate what is causing this. He has a lifelong history of sinus issues, although he is currently asymptomatic. He has not tried Claritin or Zyrtec for his symptoms. He occasionally uses a neti pot but reports no significant relief.    He vapes and is scheduled for a nicotine test as part of his wellness program.           Review of Systems   Constitutional: Negative.  Negative for activity change, chills, diaphoresis, fatigue and fever.   HENT:  Positive for congestion. Negative for dental problem, ear pain, hearing loss, rhinorrhea, sinus pain, sore throat, tinnitus and trouble swallowing.    Eyes: Negative.  Negative for pain and visual disturbance.   Respiratory: Negative.  Negative for cough, chest tightness, shortness of breath and wheezing.    Cardiovascular: Negative.  Negative for chest pain, palpitations and leg swelling.   Gastrointestinal: Negative.  Negative for abdominal pain, diarrhea, nausea and vomiting.   Endocrine: Negative.  Negative for polydipsia, polyphagia and polyuria.   Genitourinary: Negative.  Negative for difficulty urinating, dysuria, frequency and urgency.   Musculoskeletal:  Negative for arthralgias, back pain and myalgias.   Skin: Negative.  Negative for color change, pallor, rash and wound.   Allergic/Immunologic: Negative.  Negative for environmental allergies.   Neurological:  Positive for headaches. Negative for dizziness, speech difficulty, weakness, light-headedness and numbness.   Hematological: Negative.    Psychiatric/Behavioral: Negative.  Negative for confusion, decreased concentration, self-injury and suicidal ideas. The patient is not nervous/anxious.    All other systems reviewed and are negative.       Objective     Vital Signs:   /80 (BP Location: Left  "arm, Patient Position: Sitting, Cuff Size: Large Adult)   Pulse 77   Resp 18   Ht 172.7 cm (68\")   Wt 74 kg (163 lb 3.2 oz)   SpO2 97%   BMI 24.81 kg/m²   Current Outpatient Medications on File Prior to Visit   Medication Sig Dispense Refill    acetaminophen (TYLENOL) 500 MG tablet Take 1 tablet by mouth 2 (Two) Times a Day As Needed for Mild Pain, Moderate Pain, Headache or Fever.      ondansetron ODT (ZOFRAN-ODT) 4 MG disintegrating tablet Place 1 tablet on the tongue Every 8 (Eight) Hours As Needed for Nausea or Vomiting. 21 tablet 0    [DISCONTINUED] omeprazole (priLOSEC) 20 MG capsule Take 1 capsule by mouth Daily As Needed.      polyethylene glycol (MIRALAX) 17 g packet Take 17 g by mouth Daily As Needed (Use if senna-docusate is ineffective).       No current facility-administered medications on file prior to visit.        Past Medical History:   Diagnosis Date    GERD (gastroesophageal reflux disease)     Rectal bleeding       Past Surgical History:   Procedure Laterality Date    COLONOSCOPY      ENTEROSCOPY SMALL BOWEL N/A 2022    Procedure: with small bowel random biopsy and gastric random biopsy;  Surgeon: Ilna Robertson MD;  Location: [facility] END[facility]COPY;  Service: Gastroenterology;  Laterality: N/A;  biopsys    HEMORRHOIDECTOMY  Dont remember    Its been at least over 5 years ago.      Family History   Problem Relation Age of Onset    No Known Problems Mother     No Known Problems Father       Social History     Socioeconomic History    Marital status:    Tobacco Use    Smoking status: Former     Current packs/day: 0.00     Average packs/day: 1.5 packs/day for 21.0 years (31.5 ttl pk-yrs)     Types: Cigarettes     Start date: 1998     Quit date: 2019     Years since quittin.0     Passive exposure: Past    Smokeless tobacco: Never   Vaping Use    Vaping status: Every Day    Substances: Nicotine    Devices: Pre-filled or refillable cartridge   Substance and Sexual " Activity    Alcohol use: No    Drug use: No    Sexual activity: Yes     Partners: Female     Birth control/protection: None         No visits with results within 3 Month(s) from this visit.   Latest known visit with results is:   Admission on 09/30/2024, Discharged on 09/30/2024   Component Date Value Ref Range Status    Glucose 09/30/2024 105 (H)  65 - 99 mg/dL Final    BUN 09/30/2024 17  6 - 20 mg/dL Final    Creatinine 09/30/2024 0.86  0.76 - 1.27 mg/dL Final    Sodium 09/30/2024 137  136 - 145 mmol/L Final    Potassium 09/30/2024 4.3  3.5 - 5.2 mmol/L Final    Slight hemolysis detected by analyzer. Result may be falsely elevated.    Chloride 09/30/2024 104  98 - 107 mmol/L Final    CO2 09/30/2024 21.8 (L)  22.0 - 29.0 mmol/L Final    Calcium 09/30/2024 9.4  8.6 - 10.5 mg/dL Final    Total Protein 09/30/2024 7.2  6.0 - 8.5 g/dL Final    Albumin 09/30/2024 4.7  3.5 - 5.2 g/dL Final    ALT (SGPT) 09/30/2024 32  1 - 41 U/L Final    AST (SGOT) 09/30/2024 27  1 - 40 U/L Final    Alkaline Phosphatase 09/30/2024 76  39 - 117 U/L Final    Total Bilirubin 09/30/2024 0.6  0.0 - 1.2 mg/dL Final    Globulin 09/30/2024 2.5  gm/dL Final    A/G Ratio 09/30/2024 1.9  g/dL Final    BUN/Creatinine Ratio 09/30/2024 19.8  7.0 - 25.0 Final    Anion Gap 09/30/2024 11.2  5.0 - 15.0 mmol/L Final    eGFR 09/30/2024 110.2  >60.0 mL/min/1.73 Final    Lipase 09/30/2024 28  13 - 60 U/L Final    Color, UA 09/30/2024 Yellow  Yellow, Straw Final    Appearance, UA 09/30/2024 Clear  Clear Final    pH, UA 09/30/2024 7.5  5.0 - 8.0 Final    Specific Gravity, UA 09/30/2024 1.025  1.005 - 1.030 Final    Glucose, UA 09/30/2024 Negative  Negative Final    Ketones, UA 09/30/2024 Trace (A)  Negative Final    Bilirubin, UA 09/30/2024 Negative  Negative Final    Blood, UA 09/30/2024 Negative  Negative Final    Protein, UA 09/30/2024 Negative  Negative Final    Leuk Esterase, UA 09/30/2024 Trace (A)  Negative Final    Nitrite, UA 09/30/2024 Negative   Negative Final    Urobilinogen, UA 09/30/2024 1.0 E.U./dL  0.2 - 1.0 E.U./dL Final    WBC 09/30/2024 12.07 (H)  3.40 - 10.80 10*3/mm3 Final    RBC 09/30/2024 5.16  4.14 - 5.80 10*6/mm3 Final    Hemoglobin 09/30/2024 15.2  13.0 - 17.7 g/dL Final    Hematocrit 09/30/2024 43.6  37.5 - 51.0 % Final    MCV 09/30/2024 84.5  79.0 - 97.0 fL Final    MCH 09/30/2024 29.5  26.6 - 33.0 pg Final    MCHC 09/30/2024 34.9  31.5 - 35.7 g/dL Final    RDW 09/30/2024 13.0  12.3 - 15.4 % Final    RDW-SD 09/30/2024 40.2  37.0 - 54.0 fl Final    MPV 09/30/2024 9.5  6.0 - 12.0 fL Final    Platelets 09/30/2024 259  140 - 450 10*3/mm3 Final    Neutrophil % 09/30/2024 82.1 (H)  42.7 - 76.0 % Final    Lymphocyte % 09/30/2024 7.5 (L)  19.6 - 45.3 % Final    Monocyte % 09/30/2024 7.2  5.0 - 12.0 % Final    Eosinophil % 09/30/2024 2.5  0.3 - 6.2 % Final    Basophil % 09/30/2024 0.3  0.0 - 1.5 % Final    Immature Grans % 09/30/2024 0.4  0.0 - 0.5 % Final    Neutrophils, Absolute 09/30/2024 9.90 (H)  1.70 - 7.00 10*3/mm3 Final    Lymphocytes, Absolute 09/30/2024 0.91  0.70 - 3.10 10*3/mm3 Final    Monocytes, Absolute 09/30/2024 0.87  0.10 - 0.90 10*3/mm3 Final    Eosinophils, Absolute 09/30/2024 0.30  0.00 - 0.40 10*3/mm3 Final    Basophils, Absolute 09/30/2024 0.04  0.00 - 0.20 10*3/mm3 Final    Immature Grans, Absolute 09/30/2024 0.05  0.00 - 0.05 10*3/mm3 Final    nRBC 09/30/2024 0.0  0.0 - 0.2 /100 WBC Final    RBC, UA 09/30/2024 0-2  None Seen, 0-2 /HPF Final    WBC, UA 09/30/2024 0-2  None Seen, 0-2 /HPF Final    Bacteria, UA 09/30/2024 None Seen  None Seen /HPF Final    Squamous Epithelial Cells, UA 09/30/2024 0-2  None Seen, 0-2 /HPF Final    Hyaline Casts, UA 09/30/2024 None Seen  None Seen /LPF Final    Methodology 09/30/2024 Automated Microscopy   Final    Extra Tube 09/30/2024 Hold for add-ons.   Final    Auto resulted.         Physical Exam  Vitals and nursing note reviewed.   Constitutional:       Appearance: Normal appearance. He is  normal weight.   HENT:      Head: Normocephalic and atraumatic.   Cardiovascular:      Rate and Rhythm: Normal rate and regular rhythm.      Pulses: Normal pulses.      Heart sounds: Normal heart sounds. No murmur heard.     No friction rub. No gallop.   Pulmonary:      Effort: Pulmonary effort is normal. No respiratory distress.      Breath sounds: Normal breath sounds. No stridor. No wheezing, rhonchi or rales.   Chest:      Chest wall: No tenderness.   Abdominal:      General: Abdomen is flat. Bowel sounds are normal. There is no distension.      Palpations: Abdomen is soft. There is no mass.      Tenderness: There is no abdominal tenderness. There is no right CVA tenderness, left CVA tenderness, guarding or rebound.      Hernia: No hernia is present.   Skin:     General: Skin is warm and dry.      Capillary Refill: Capillary refill takes less than 2 seconds.      Coloration: Skin is not jaundiced or pale.   Neurological:      General: No focal deficit present.      Mental Status: He is alert and oriented to person, place, and time. Mental status is at baseline.      Motor: No weakness.      Coordination: Coordination normal.      Gait: Gait normal.   Psychiatric:         Mood and Affect: Mood normal.         Behavior: Behavior normal.         Thought Content: Thought content normal.         Judgment: Judgment normal.          Physical Exam         Result Review  Data Reviewed:{ Labs  Result Review  Imaging  Med Tab  Media :23}   I have reviewed this patient's chart.  I have reviewed previous labs, previous imaging, previous medications, and previous encounters with notes that were available in this patient's chart.    Results  Laboratory Studies  A1c was 5.3.              Assessment and Plan {CC Problem List  Visit Diagnosis  ROS  Review (Popup)  [employer]  Quality  BestPractice  Medications  SmartSets  SnapShot Encounters  Media :23}   Diagnoses and all orders for this visit:    1.  Mixed hyperlipidemia (Primary)  -     Lipid Panel; Future    2. Gastroesophageal reflux disease, unspecified whether esophagitis present  -     omeprazole (priLOSEC) 20 MG capsule; Take 1 capsule by mouth Daily.  Dispense: 30 capsule; Refill: 2    3. Need for Tdap vaccination  -     Cancel: Tdap Vaccine Greater Than or Equal To 8yo IM  -     Tdap Vaccine Greater Than or Equal To 8yo IM; Future    4. Screening for endocrine, metabolic and immunity disorder  -     CBC & Differential; Future  -     Comprehensive Metabolic Panel; Future    5. Screening for diabetes mellitus  -     Hemoglobin A1c; Future    6. Screening for thyroid disorder  -     TSH; Future    7. Nicotine dependence with current use  -     Nicotine & Metabolite, Quant; Future    8. Allergy, subsequent encounter    9. Chronic congestion of paranasal sinus        Assessment & Plan  1. Hyperlipidemia.  His blood glucose levels are within the normal range, indicating good tolerance to pasta. His A1c level is 5.3, which is optimal. He is advised to monitor his fat intake, particularly from sauces, and avoid additional fats or salts. A comprehensive set of laboratory tests will be ordered, including thyroid, lipid panel, A1c, CMP, nicotine, and CBC. He is also due for his tetanus vaccine, which will be administered during this visit or when he is next available.    2. Gastroesophageal Reflux Disease (GERD).  A prescription for omeprazole will be provided, initially for a 30-day supply with refills, to allow for price comparison. If the medication is well-covered by insurance, a 90-day supply will be considered.    3. Headaches.  He is advised to monitor his blood pressure daily and maintain a log. He should also check his blood glucose levels when experiencing headaches. If these measures do not provide any insights, a workup for migraines, including a CT scan of the head, will be considered. If necessary, a referral to a neurologist will be made.    4.  Sinus issues.  He is advised to start a regimen of Claritin daily or Zyrtec nightly, depending on the severity of his symptoms.    5. Nicotine dependence.  A nicotine test will be included in the comprehensive set of laboratory tests ordered.       -ER red flags discussed with patient including risk versus benefit and education provided.  -Follow-up with me in 3 months or sooner if needed.    I spent 40 minutes caring for Gerardo on this date of service. This time includes time spent by me in the following activities:preparing for the visit, reviewing tests, obtaining and/or reviewing a separately obtained history, performing a medically appropriate examination and/or evaluation , counseling and educating the patient/family/caregiver, ordering medications, tests, or procedures, referring and communicating with other health care professionals , documenting information in the medical record, independently interpreting results and communicating that information with the patient/family/caregiver, and care coordination.    Follow Up {Instructions Charge Capture  Follow-up Communications :23}     Patient was given instructions and counseling regarding his condition or for health maintenance advice. Please see specific information pulled into the AVS (placed there by myself) if appropriate.    Return in about 3 months (around 4/3/2025) for routine follow up.    BMI is within normal parameters. No other follow-up for BMI required.         JASEN Brown, [facility]      Patient or patient representative verbalized consent for the use of Ambient Listening during the visit with  JASEN Brown for chart documentation. 1/3/2025  14:02 EST

## 2025-02-11 ENCOUNTER — TELEPHONE (OUTPATIENT)
Dept: FAMILY MEDICINE CLINIC | Facility: CLINIC | Age: 45
End: 2025-02-11

## 2025-02-11 ENCOUNTER — TELEPHONE (OUTPATIENT)
Dept: FAMILY MEDICINE CLINIC | Facility: CLINIC | Age: 45
End: 2025-02-11
Payer: COMMERCIAL

## 2025-02-11 NOTE — TELEPHONE ENCOUNTER
PATIENT IS RESCHEDULED WITH [facility] ON 02/12/25. PATIENT STATES MARCH WAS TO LATE TO GET THIS COMPLETED.

## 2025-02-11 NOTE — TELEPHONE ENCOUNTER
Hub is instructed to relay the documentation below to patient called pt in regards to his appt tomorrow. The pt's provider is out sick; therefore, he needs to be RS. A VM was left for the pt to call the office to RS. According to the pt's verbal it's okay to LVM.

## 2025-02-12 ENCOUNTER — OFFICE VISIT (OUTPATIENT)
Dept: FAMILY MEDICINE CLINIC | Facility: CLINIC | Age: 45
End: 2025-02-12
Payer: COMMERCIAL

## 2025-02-12 VITALS
RESPIRATION RATE: 18 BRPM | OXYGEN SATURATION: 98 % | SYSTOLIC BLOOD PRESSURE: 116 MMHG | TEMPERATURE: 97.9 F | WEIGHT: 168.4 LBS | DIASTOLIC BLOOD PRESSURE: 80 MMHG | HEART RATE: 72 BPM | HEIGHT: 68 IN | BODY MASS INDEX: 25.52 KG/M2

## 2025-02-12 DIAGNOSIS — Z13.0 SCREENING FOR ENDOCRINE, METABOLIC AND IMMUNITY DISORDER: ICD-10-CM

## 2025-02-12 DIAGNOSIS — Z00.00 ANNUAL PHYSICAL EXAM: Primary | ICD-10-CM

## 2025-02-12 DIAGNOSIS — Z13.1 SCREENING FOR DIABETES MELLITUS: ICD-10-CM

## 2025-02-12 DIAGNOSIS — Z13.29 SCREENING FOR ENDOCRINE, METABOLIC AND IMMUNITY DISORDER: ICD-10-CM

## 2025-02-12 DIAGNOSIS — E78.2 MIXED HYPERLIPIDEMIA: ICD-10-CM

## 2025-02-12 DIAGNOSIS — J32.9 CHRONIC CONGESTION OF PARANASAL SINUS: ICD-10-CM

## 2025-02-12 DIAGNOSIS — F17.200 NICOTINE DEPENDENCE WITH CURRENT USE: ICD-10-CM

## 2025-02-12 DIAGNOSIS — Z23 NEED FOR TDAP VACCINATION: ICD-10-CM

## 2025-02-12 DIAGNOSIS — Z13.29 SCREENING FOR THYROID DISORDER: ICD-10-CM

## 2025-02-12 DIAGNOSIS — K21.9 GASTROESOPHAGEAL REFLUX DISEASE, UNSPECIFIED WHETHER ESOPHAGITIS PRESENT: ICD-10-CM

## 2025-02-12 DIAGNOSIS — T78.40XD ALLERGY, SUBSEQUENT ENCOUNTER: ICD-10-CM

## 2025-02-12 DIAGNOSIS — Z13.228 SCREENING FOR ENDOCRINE, METABOLIC AND IMMUNITY DISORDER: ICD-10-CM

## 2025-02-12 PROCEDURE — 99396 PREV VISIT EST AGE 40-64: CPT

## 2025-02-12 RX ORDER — IBUPROFEN 200 MG
600 TABLET ORAL EVERY 6 HOURS PRN
COMMUNITY

## 2025-02-12 NOTE — PROGRESS NOTES
Chief Complaint  Annual Exam    Subjective    History of Present Illness {CC  Problem List  Visit  Diagnosis   Encounters  Notes  Medications  Labs  Result Review Imaging  Media :23}     Gerardo Hyatt presents to [facility] for Annual Exam.      History of Present Illness     44 YOM presents for annual exam.     Patient needs to complete this for work. He did see his regular PCP in this office 1 month ago but apparently that visit would not suffice without completing labs as well and labeling annual exam.     Patient reports he has GERD, HLD, hemorrhoids, previous small bowel obstruction, chronic headaches thought to be possibly associated with allergies. Patient says he does have ongoing GI issues but no diagnosis has been made. He says he has had multiple endoscopy and colonoscopy. He says he was told they thought he had Crohns disease, but then he was told that was likely not the case. He says the only medication he takes related to this is Mirlax when he starts experiencing constipation. Patient does still follow with GI. No acute concerns.     He has been managing his hyperlipidemia through dietary modifications, specifically by substituting beef with turkey and chicken. He is scheduled for a wellness program at work on 03/31/2025, which includes a nicotine test.  Patient will like to get labs this week to have this wellness form filled out.     In regards to GERD, he has been self-medicating with over-the-counter omeprazole for his GERD. He is open to the possibility of obtaining a prescription for this medication.  He shares that he is only done this because it was more cost effective.  Patient does try to follow a bland diet when possible.  He denies any concerns or issues with his GERD or his omeprazole at this time. Rx for this medication was sent for patient at previous visit.      In regards to chronic headaches, he has been experiencing chronic headaches for  "several years, which are typically managed with Tylenol. However, he reported that his usual analgesics, including Aleve, were ineffective in alleviating the headaches he experienced last Saturday and Sunday. These headaches were severe enough to disrupt his sleep but subsided after he went to bed. The pain is predominantly located at the back of his head, radiating to other areas. The intensity of the pain varies, sometimes preventing him from sitting or lying down, while at other times, standing provides some relief. The severity of the headaches also induces nausea and dizziness.  Discussed options to investigate what is causing this. He has a lifelong history of sinus issues, although he is currently asymptomatic. He has not tried Claritin or Zyrtec for his symptoms. He occasionally uses a neti pot but reports no significant relief.        Objective     Vital Signs:   /80 (BP Location: Left arm, Patient Position: Sitting, Cuff Size: Adult)   Pulse 72   Temp 97.9 °F (36.6 °C) (Oral)   Resp 18   Ht 172.7 cm (68\")   Wt 76.4 kg (168 lb 6.4 oz)   SpO2 98%   BMI 25.61 kg/m²   Current Outpatient Medications on File Prior to Visit   Medication Sig Dispense Refill    acetaminophen (TYLENOL) 500 MG tablet Take 1 tablet by mouth 2 (Two) Times a Day As Needed for Mild Pain, Moderate Pain, Headache or Fever.      ibuprofen (ADVIL,MOTRIN) 200 MG tablet Take 3 tablets by mouth Every 6 (Six) Hours As Needed for Mild Pain.      omeprazole (priLOSEC) 20 MG capsule Take 1 capsule by mouth Daily. 30 capsule 2    ondansetron ODT (ZOFRAN-ODT) 4 MG disintegrating tablet Place 1 tablet on the tongue Every 8 (Eight) Hours As Needed for Nausea or Vomiting. 21 tablet 0    Polyethylene Glycol 3350 (MIRALAX PO) Take 1 each by mouth Daily As Needed.      polyethylene glycol (MIRALAX) 17 g packet Take 17 g by mouth Daily As Needed (Use if senna-docusate is ineffective).       No current facility-administered medications on file " prior to visit.        Past Medical History:   Diagnosis Date    GERD (gastroesophageal reflux disease)     Rectal bleeding       Past Surgical History:   Procedure Laterality Date    COLONOSCOPY      ENTEROSCOPY SMALL BOWEL N/A 2022    Procedure: with small bowel random biopsy and gastric random biopsy;  Surgeon: Ilan Robertson MD;  Location: [facility];  Service: Gastroenterology;  Laterality: N/A;  biopsys    HEMORRHOIDECTOMY  Dont remember    Its been at least over 5 years ago.      Family History   Problem Relation Age of Onset    No Known Problems Mother     No Known Problems Father       Social History     Socioeconomic History    Marital status:    Tobacco Use    Smoking status: Former     Current packs/day: 0.00     Average packs/day: 1.5 packs/day for 21.0 years (31.5 ttl pk-yrs)     Types: Cigarettes     Start date: 1998     Quit date: 2019     Years since quittin.1     Passive exposure: Past    Smokeless tobacco: Never   Vaping Use    Vaping status: Every Day    Substances: Nicotine    Devices: Pre-filled or refillable cartridge   Substance and Sexual Activity    Alcohol use: No    Drug use: No    Sexual activity: Yes     Partners: Female     Birth control/protection: None         No visits with results within 3 Month(s) from this visit.   Latest known visit with results is:   Admission on 2024, Discharged on 2024   Component Date Value Ref Range Status    Glucose 2024 105 (H)  65 - 99 mg/dL Final    BUN 2024 17  6 - 20 mg/dL Final    Creatinine 2024 0.86  0.76 - 1.27 mg/dL Final    Sodium 2024 137  136 - 145 mmol/L Final    Potassium 2024 4.3  3.5 - 5.2 mmol/L Final    Slight hemolysis detected by analyzer. Result may be falsely elevated.    Chloride 2024 104  98 - 107 mmol/L Final    CO2 2024 21.8 (L)  22.0 - 29.0 mmol/L Final    Calcium 2024 9.4  8.6 - 10.5 mg/dL Final    Total Protein 2024 7.2   6.0 - 8.5 g/dL Final    Albumin 09/30/2024 4.7  3.5 - 5.2 g/dL Final    ALT (SGPT) 09/30/2024 32  1 - 41 U/L Final    AST (SGOT) 09/30/2024 27  1 - 40 U/L Final    Alkaline Phosphatase 09/30/2024 76  39 - 117 U/L Final    Total Bilirubin 09/30/2024 0.6  0.0 - 1.2 mg/dL Final    Globulin 09/30/2024 2.5  gm/dL Final    A/G Ratio 09/30/2024 1.9  g/dL Final    BUN/Creatinine Ratio 09/30/2024 19.8  7.0 - 25.0 Final    Anion Gap 09/30/2024 11.2  5.0 - 15.0 mmol/L Final    eGFR 09/30/2024 110.2  >60.0 mL/min/1.73 Final    Lipase 09/30/2024 28  13 - 60 U/L Final    Color, UA 09/30/2024 Yellow  Yellow, Straw Final    Appearance, UA 09/30/2024 Clear  Clear Final    pH, UA 09/30/2024 7.5  5.0 - 8.0 Final    Specific Gravity, UA 09/30/2024 1.025  1.005 - 1.030 Final    Glucose, UA 09/30/2024 Negative  Negative Final    Ketones, UA 09/30/2024 Trace (A)  Negative Final    Bilirubin, UA 09/30/2024 Negative  Negative Final    Blood, UA 09/30/2024 Negative  Negative Final    Protein, UA 09/30/2024 Negative  Negative Final    Leuk Esterase, UA 09/30/2024 Trace (A)  Negative Final    Nitrite, UA 09/30/2024 Negative  Negative Final    Urobilinogen, UA 09/30/2024 1.0 E.U./dL  0.2 - 1.0 E.U./dL Final    WBC 09/30/2024 12.07 (H)  3.40 - 10.80 10*3/mm3 Final    RBC 09/30/2024 5.16  4.14 - 5.80 10*6/mm3 Final    Hemoglobin 09/30/2024 15.2  13.0 - 17.7 g/dL Final    Hematocrit 09/30/2024 43.6  37.5 - 51.0 % Final    MCV 09/30/2024 84.5  79.0 - 97.0 fL Final    MCH 09/30/2024 29.5  26.6 - 33.0 pg Final    MCHC 09/30/2024 34.9  31.5 - 35.7 g/dL Final    RDW 09/30/2024 13.0  12.3 - 15.4 % Final    RDW-SD 09/30/2024 40.2  37.0 - 54.0 fl Final    MPV 09/30/2024 9.5  6.0 - 12.0 fL Final    Platelets 09/30/2024 259  140 - 450 10*3/mm3 Final    Neutrophil % 09/30/2024 82.1 (H)  42.7 - 76.0 % Final    Lymphocyte % 09/30/2024 7.5 (L)  19.6 - 45.3 % Final    Monocyte % 09/30/2024 7.2  5.0 - 12.0 % Final    Eosinophil % 09/30/2024 2.5  0.3 - 6.2 %  Final    Basophil % 09/30/2024 0.3  0.0 - 1.5 % Final    Immature Grans % 09/30/2024 0.4  0.0 - 0.5 % Final    Neutrophils, Absolute 09/30/2024 9.90 (H)  1.70 - 7.00 10*3/mm3 Final    Lymphocytes, Absolute 09/30/2024 0.91  0.70 - 3.10 10*3/mm3 Final    Monocytes, Absolute 09/30/2024 0.87  0.10 - 0.90 10*3/mm3 Final    Eosinophils, Absolute 09/30/2024 0.30  0.00 - 0.40 10*3/mm3 Final    Basophils, Absolute 09/30/2024 0.04  0.00 - 0.20 10*3/mm3 Final    Immature Grans, Absolute 09/30/2024 0.05  0.00 - 0.05 10*3/mm3 Final    nRBC 09/30/2024 0.0  0.0 - 0.2 /100 WBC Final    RBC, UA 09/30/2024 0-2  None Seen, 0-2 /HPF Final    WBC, UA 09/30/2024 0-2  None Seen, 0-2 /HPF Final    Bacteria, UA 09/30/2024 None Seen  None Seen /HPF Final    Squamous Epithelial Cells, UA 09/30/2024 0-2  None Seen, 0-2 /HPF Final    Hyaline Casts, UA 09/30/2024 None Seen  None Seen /LPF Final    Methodology 09/30/2024 Automated Microscopy   Final    Extra Tube 09/30/2024 Hold for add-ons.   Final    Auto resulted.         Physical Exam  Constitutional:       General: He is not in acute distress.     Appearance: Normal appearance. He is not ill-appearing.   HENT:      Head: Normocephalic and atraumatic.      Right Ear: Tympanic membrane and ear canal normal.      Left Ear: Tympanic membrane and ear canal normal.      Nose: Nose normal.      Mouth/Throat:      Mouth: Mucous membranes are moist.      Pharynx: No oropharyngeal exudate or posterior oropharyngeal erythema.   Eyes:      General:         Right eye: No discharge.         Left eye: No discharge.      Extraocular Movements: Extraocular movements intact.      Conjunctiva/sclera: Conjunctivae normal.   Cardiovascular:      Rate and Rhythm: Normal rate and regular rhythm.      Pulses: Normal pulses.      Heart sounds: Normal heart sounds. No murmur heard.     No friction rub. No gallop.   Pulmonary:      Effort: Pulmonary effort is normal. No respiratory distress.      Breath sounds: Normal  breath sounds. No stridor. No wheezing, rhonchi or rales.   Abdominal:      General: Bowel sounds are normal. There is no distension.      Palpations: Abdomen is soft. There is no mass.      Tenderness: There is no abdominal tenderness. There is no right CVA tenderness, left CVA tenderness, guarding or rebound.      Hernia: No hernia is present.   Musculoskeletal:         General: Normal range of motion.      Cervical back: Normal range of motion and neck supple.   Skin:     General: Skin is warm and dry.      Findings: No rash.   Neurological:      General: No focal deficit present.      Mental Status: He is alert.      Motor: No weakness.   Psychiatric:         Mood and Affect: Mood normal.         Behavior: Behavior normal.         Thought Content: Thought content normal.         Judgment: Judgment normal.          Result Review  Data Reviewed:{ Labs  Result Review  Imaging  Med Tab  Media :23}   I have reviewed this patient's chart.  I have reviewed previous labs, previous imaging, previous medications, and previous encounters with notes that were available in this patient's chart.               Assessment and Plan {CC Problem List  Visit Diagnosis  ROS  Review (Popup)  [facility] Maintenance  Quality  BestPractice  Medications  SmartSets  SnapShot Encounters  Media :23}      Annual physical exam         Gastroesophageal reflux disease, unspecified whether esophagitis present  Patient will continue Omeprazole        Mixed hyperlipidemia   Lipid abnormalities are  due for recheck    Plan:  Lipid lowering therapy not prescribed due to rechecking lipid panel after diet change    Discussed medication dosage, use, side effects, and goals of treatment in detail.    Counseled patient on lifestyle modifications to help control hyperlipidemia.     Patient Treatment Goals:   LDL goal is under 100         Need for Tdap vaccination  Tdap was ordered last visit but unclear if it was given. He has another follow  up with primary care next month so will report this to regular PCP and see if they should complete it next time.        Screening for endocrine, metabolic and immunity disorder  TSH, CBC, CMP ordered last visit and will be completed next week        Screening for diabetes mellitus  A1c, CMP ordered last visit and will be completed next week        Screening for thyroid disorder  TSH ordered last visit and will be completed next week.        Nicotine dependence with current use  Nicotine lab ordered last visit. Patient will complete next week.                            Allergy, subsequent encounter  He is starting Claritan or Zyrtec daily use to see if this helps with allergies and headaches       Chronic congestion of paranasal sinus  He is starting Claritan or Zyrtec daily use to see if this helps with allergies and headaches            -Patient reported that he needed to complete an annual physical exam and labs for work requirement.  He did bring a form to sign but all it stated was to complete an annual physical exam.  I did complete full exam today although it seems like he was just seen by regular PCP 1 month ago - unsure if that would not count because it was not technically an annual exam. Patient was then unable to get in with regular PCP for annual exam until next month, which is too late for this work requirement. All necessary labs for annual exam were ordered last visit. Patient is not fasting today so he will return on 02/18/2025 for fasting labs only visit.     -ER red flags discussed with patient including risk versus benefit and education provided.      Follow Up {Instructions Charge Capture  Follow-up Communications :23}     Patient was given instructions and counseling regarding his condition or for health maintenance advice. Please see specific information pulled into the AVS (placed there by myself) if appropriate.    Return in 6 days (on 2/18/2025) for fasting labs .      Katey Barragan,  NAZARIO

## 2025-02-18 ENCOUNTER — CLINICAL SUPPORT (OUTPATIENT)
Dept: FAMILY MEDICINE CLINIC | Facility: CLINIC | Age: 45
End: 2025-02-18
Payer: COMMERCIAL

## 2025-02-18 DIAGNOSIS — Z13.29 SCREENING FOR THYROID DISORDER: ICD-10-CM

## 2025-02-18 DIAGNOSIS — F17.200 NICOTINE DEPENDENCE WITH CURRENT USE: ICD-10-CM

## 2025-02-18 DIAGNOSIS — Z13.228 SCREENING FOR ENDOCRINE, METABOLIC AND IMMUNITY DISORDER: ICD-10-CM

## 2025-02-18 DIAGNOSIS — Z13.29 SCREENING FOR ENDOCRINE, METABOLIC AND IMMUNITY DISORDER: ICD-10-CM

## 2025-02-18 DIAGNOSIS — E78.2 MIXED HYPERLIPIDEMIA: ICD-10-CM

## 2025-02-18 DIAGNOSIS — Z13.1 SCREENING FOR DIABETES MELLITUS: ICD-10-CM

## 2025-02-18 DIAGNOSIS — Z13.0 SCREENING FOR ENDOCRINE, METABOLIC AND IMMUNITY DISORDER: ICD-10-CM

## 2025-02-18 LAB
BASOPHILS # BLD AUTO: 0.09 10*3/MM3 (ref 0–0.2)
BASOPHILS NFR BLD AUTO: 1.4 % (ref 0–1.5)
DEPRECATED RDW RBC AUTO: 41 FL (ref 37–54)
EOSINOPHIL # BLD AUTO: 0.25 10*3/MM3 (ref 0–0.4)
EOSINOPHIL NFR BLD AUTO: 3.8 % (ref 0.3–6.2)
ERYTHROCYTE [DISTWIDTH] IN BLOOD BY AUTOMATED COUNT: 12.8 % (ref 12.3–15.4)
HCT VFR BLD AUTO: 43.9 % (ref 37.5–51)
HGB BLD-MCNC: 14.5 G/DL (ref 13–17.7)
IMM GRANULOCYTES # BLD AUTO: 0.02 10*3/MM3 (ref 0–0.05)
IMM GRANULOCYTES NFR BLD AUTO: 0.3 % (ref 0–0.5)
LYMPHOCYTES # BLD AUTO: 2.13 10*3/MM3 (ref 0.7–3.1)
LYMPHOCYTES NFR BLD AUTO: 32.6 % (ref 19.6–45.3)
MCH RBC QN AUTO: 28.9 PG (ref 26.6–33)
MCHC RBC AUTO-ENTMCNC: 33 G/DL (ref 31.5–35.7)
MCV RBC AUTO: 87.6 FL (ref 79–97)
MONOCYTES # BLD AUTO: 0.65 10*3/MM3 (ref 0.1–0.9)
MONOCYTES NFR BLD AUTO: 9.9 % (ref 5–12)
NEUTROPHILS NFR BLD AUTO: 3.4 10*3/MM3 (ref 1.7–7)
NEUTROPHILS NFR BLD AUTO: 52 % (ref 42.7–76)
NRBC BLD AUTO-RTO: 0 /100 WBC (ref 0–0.2)
PLATELET # BLD AUTO: 302 10*3/MM3 (ref 140–450)
PMV BLD AUTO: 10.5 FL (ref 6–12)
RBC # BLD AUTO: 5.01 10*6/MM3 (ref 4.14–5.8)
WBC NRBC COR # BLD AUTO: 6.54 10*3/MM3 (ref 3.4–10.8)

## 2025-02-18 PROCEDURE — 85025 COMPLETE CBC W/AUTO DIFF WBC: CPT | Performed by: REGISTERED NURSE

## 2025-02-18 PROCEDURE — 83036 HEMOGLOBIN GLYCOSYLATED A1C: CPT | Performed by: REGISTERED NURSE

## 2025-02-18 PROCEDURE — 80053 COMPREHEN METABOLIC PANEL: CPT | Performed by: REGISTERED NURSE

## 2025-02-18 PROCEDURE — 36415 COLL VENOUS BLD VENIPUNCTURE: CPT | Performed by: REGISTERED NURSE

## 2025-02-18 PROCEDURE — 84443 ASSAY THYROID STIM HORMONE: CPT | Performed by: REGISTERED NURSE

## 2025-02-18 PROCEDURE — 80061 LIPID PANEL: CPT | Performed by: REGISTERED NURSE

## 2025-02-18 PROCEDURE — G0480 DRUG TEST DEF 1-7 CLASSES: HCPCS | Performed by: REGISTERED NURSE

## 2025-02-18 NOTE — PROGRESS NOTES
Venipuncture Blood Specimen Collection  Venipuncture performed in [facility] ARM by Emiliano Calderon with good hemostasis. Patient tolerated the procedure well without complications.   02/18/25   Emiliano Calderon

## 2025-02-19 DIAGNOSIS — E78.2 MIXED HYPERLIPIDEMIA: Primary | ICD-10-CM

## 2025-02-19 LAB
ALBUMIN SERPL-MCNC: 4.2 G/DL (ref 3.5–5.2)
ALBUMIN/GLOB SERPL: 1.3 G/DL
ALP SERPL-CCNC: 59 U/L (ref 39–117)
ALT SERPL W P-5'-P-CCNC: 44 U/L (ref 1–41)
ANION GAP SERPL CALCULATED.3IONS-SCNC: 10.6 MMOL/L (ref 5–15)
AST SERPL-CCNC: 27 U/L (ref 1–40)
BILIRUB SERPL-MCNC: 0.5 MG/DL (ref 0–1.2)
BUN SERPL-MCNC: 18 MG/DL (ref 6–20)
BUN/CREAT SERPL: 17.8 (ref 7–25)
CALCIUM SPEC-SCNC: 9.7 MG/DL (ref 8.6–10.5)
CHLORIDE SERPL-SCNC: 102 MMOL/L (ref 98–107)
CHOLEST SERPL-MCNC: 209 MG/DL (ref 0–200)
CO2 SERPL-SCNC: 27.4 MMOL/L (ref 22–29)
CREAT SERPL-MCNC: 1.01 MG/DL (ref 0.76–1.27)
EGFRCR SERPLBLD CKD-EPI 2021: 94.6 ML/MIN/1.73
GLOBULIN UR ELPH-MCNC: 3.2 GM/DL
GLUCOSE SERPL-MCNC: 90 MG/DL (ref 65–99)
HBA1C MFR BLD: 5.3 % (ref 4.8–5.6)
HDLC SERPL-MCNC: 39 MG/DL (ref 40–60)
LDLC SERPL CALC-MCNC: 150 MG/DL (ref 0–100)
LDLC/HDLC SERPL: 3.78 {RATIO}
POTASSIUM SERPL-SCNC: 4.3 MMOL/L (ref 3.5–5.2)
PROT SERPL-MCNC: 7.4 G/DL (ref 6–8.5)
SODIUM SERPL-SCNC: 140 MMOL/L (ref 136–145)
TRIGL SERPL-MCNC: 112 MG/DL (ref 0–150)
TSH SERPL DL<=0.05 MIU/L-ACNC: 1.34 UIU/ML (ref 0.27–4.2)
VLDLC SERPL-MCNC: 20 MG/DL (ref 5–40)

## 2025-02-19 RX ORDER — ATORVASTATIN CALCIUM 10 MG/1
10 TABLET, FILM COATED ORAL DAILY
Qty: 90 TABLET | Refills: 1 | Status: SHIPPED | OUTPATIENT
Start: 2025-02-19

## 2025-03-05 LAB
COTININE SERPL-MCNC: 332.2 NG/ML
NICOTINE SERPL-MCNC: 34.9 NG/ML

## 2025-08-11 DIAGNOSIS — E78.2 MIXED HYPERLIPIDEMIA: ICD-10-CM

## 2025-08-11 RX ORDER — ATORVASTATIN CALCIUM 10 MG/1
10 TABLET, FILM COATED ORAL DAILY
Qty: 30 TABLET | Refills: 0 | Status: SHIPPED | OUTPATIENT
Start: 2025-08-11

## (undated) DEVICE — PK ENDO GI 50

## (undated) DEVICE — BITEBLOCK ENDO W/STRAP 60F A/ LF DISP

## (undated) DEVICE — SINGLE-USE BIOPSY FORCEPS: Brand: RADIAL JAW 4